# Patient Record
Sex: FEMALE | Race: WHITE
[De-identification: names, ages, dates, MRNs, and addresses within clinical notes are randomized per-mention and may not be internally consistent; named-entity substitution may affect disease eponyms.]

---

## 2017-03-09 ENCOUNTER — HOSPITAL ENCOUNTER (EMERGENCY)
Dept: HOSPITAL 62 - ER | Age: 62
Discharge: HOME | End: 2017-03-09
Payer: MEDICARE

## 2017-03-09 VITALS — DIASTOLIC BLOOD PRESSURE: 92 MMHG | SYSTOLIC BLOOD PRESSURE: 126 MMHG

## 2017-03-09 DIAGNOSIS — F17.200: ICD-10-CM

## 2017-03-09 DIAGNOSIS — Z88.1: ICD-10-CM

## 2017-03-09 DIAGNOSIS — Z87.01: ICD-10-CM

## 2017-03-09 DIAGNOSIS — R05: ICD-10-CM

## 2017-03-09 DIAGNOSIS — Z79.899: ICD-10-CM

## 2017-03-09 DIAGNOSIS — J44.1: Primary | ICD-10-CM

## 2017-03-09 DIAGNOSIS — R06.02: ICD-10-CM

## 2017-03-09 DIAGNOSIS — I10: ICD-10-CM

## 2017-03-09 LAB
ALBUMIN SERPL-MCNC: 4.4 G/DL (ref 3.5–5)
ALP SERPL-CCNC: 70 U/L (ref 38–126)
ALT SERPL-CCNC: 27 U/L (ref 9–52)
ANION GAP SERPL CALC-SCNC: 14 MMOL/L (ref 5–19)
AST SERPL-CCNC: 24 U/L (ref 14–36)
BASOPHILS # BLD AUTO: 0.1 10^3/UL (ref 0–0.2)
BASOPHILS NFR BLD AUTO: 0.9 % (ref 0–2)
BILIRUB DIRECT SERPL-MCNC: 0 MG/DL (ref 0–0.3)
BILIRUB SERPL-MCNC: 0.4 MG/DL (ref 0.2–1.3)
BUN SERPL-MCNC: 12 MG/DL (ref 7–20)
CALCIUM: 11.8 MG/DL (ref 8.4–10.2)
CHLORIDE SERPL-SCNC: 99 MMOL/L (ref 98–107)
CK MB SERPL-MCNC: 1.21 NG/ML (ref ?–4.55)
CK SERPL-CCNC: 53 U/L (ref 30–135)
CO2 SERPL-SCNC: 29 MMOL/L (ref 22–30)
CREAT SERPL-MCNC: 0.55 MG/DL (ref 0.52–1.25)
EOSINOPHIL # BLD AUTO: 1.1 10^3/UL (ref 0–0.6)
EOSINOPHIL NFR BLD AUTO: 12.7 % (ref 0–6)
ERYTHROCYTE [DISTWIDTH] IN BLOOD BY AUTOMATED COUNT: 12.8 % (ref 11.5–14)
GLUCOSE SERPL-MCNC: 97 MG/DL (ref 75–110)
HCT VFR BLD CALC: 43.3 % (ref 36–47)
HGB BLD-MCNC: 14.6 G/DL (ref 12–15.5)
HGB HCT DIFFERENCE: 0.5
LYMPHOCYTES # BLD AUTO: 2 10^3/UL (ref 0.5–4.7)
LYMPHOCYTES NFR BLD AUTO: 22.8 % (ref 13–45)
MCH RBC QN AUTO: 31.1 PG (ref 27–33.4)
MCHC RBC AUTO-ENTMCNC: 33.7 G/DL (ref 32–36)
MCV RBC AUTO: 92 FL (ref 80–97)
MONOCYTES # BLD AUTO: 0.7 10^3/UL (ref 0.1–1.4)
MONOCYTES NFR BLD AUTO: 8.3 % (ref 3–13)
NEUTROPHILS # BLD AUTO: 4.9 10^3/UL (ref 1.7–8.2)
NEUTS SEG NFR BLD AUTO: 55.3 % (ref 42–78)
POTASSIUM SERPL-SCNC: 4.2 MMOL/L (ref 3.6–5)
PROT SERPL-MCNC: 8 G/DL (ref 6.3–8.2)
RBC # BLD AUTO: 4.7 10^6/UL (ref 3.72–5.28)
SODIUM SERPL-SCNC: 142.2 MMOL/L (ref 137–145)
TROPONIN I SERPL-MCNC: < 0.012 NG/ML
WBC # BLD AUTO: 8.8 10^3/UL (ref 4–10.5)

## 2017-03-09 PROCEDURE — 84484 ASSAY OF TROPONIN QUANT: CPT

## 2017-03-09 PROCEDURE — 82553 CREATINE MB FRACTION: CPT

## 2017-03-09 PROCEDURE — 36415 COLL VENOUS BLD VENIPUNCTURE: CPT

## 2017-03-09 PROCEDURE — 93005 ELECTROCARDIOGRAM TRACING: CPT

## 2017-03-09 PROCEDURE — 93010 ELECTROCARDIOGRAM REPORT: CPT

## 2017-03-09 PROCEDURE — 96374 THER/PROPH/DIAG INJ IV PUSH: CPT

## 2017-03-09 PROCEDURE — 80053 COMPREHEN METABOLIC PANEL: CPT

## 2017-03-09 PROCEDURE — 82550 ASSAY OF CK (CPK): CPT

## 2017-03-09 PROCEDURE — 83880 ASSAY OF NATRIURETIC PEPTIDE: CPT

## 2017-03-09 PROCEDURE — 94640 AIRWAY INHALATION TREATMENT: CPT

## 2017-03-09 PROCEDURE — 85025 COMPLETE CBC W/AUTO DIFF WBC: CPT

## 2017-03-09 PROCEDURE — 71010: CPT

## 2017-03-09 PROCEDURE — 99285 EMERGENCY DEPT VISIT HI MDM: CPT

## 2017-03-09 RX ADMIN — ALBUTEROL SULFATE SCH MG: 2.5 SOLUTION RESPIRATORY (INHALATION) at 11:14

## 2017-03-09 RX ADMIN — ALBUTEROL SULFATE SCH MG: 2.5 SOLUTION RESPIRATORY (INHALATION) at 10:49

## 2017-03-09 NOTE — ER DOCUMENT REPORT
ED Medical Screen (RME)





- General


Chief Complaint: Breathing Difficulty


Stated Complaint: SHORTNESS OF BREATH


Notes: 


difficulty breathing and SOB, saw her PCP 3 days ago and was put on abx and 

nebulizers. presents today with worsening shortness of breath. denies chest pain








PMH: COPD, former smoker





Wheezing b/l throughout, difficulty speaking











I have greeted and performed a rapid initial assessment of this patient. A 

comprehensive ED assessment and evaluation of the patient, analysis of test 

results and completion of the medical decision making process will be conducted 

by additional ED providers.


TRAVEL OUTSIDE OF THE [place] IN LAST 30 DAYS: No





- Related Data


Allergies/Adverse Reactions: 


 





ciprofloxacin [From Cipro] Allergy (Verified 03/09/17 10:05)


 


ciprofloxacin HCl [From Cipro] Allergy (Verified 03/09/17 10:05)


 


mycins Allergy (Unknown, Uncoded 03/09/17 10:05)


 











Past Medical History





- Social History


Chew tobacco use (# tins/day): No


Frequency of alcohol use: None


Drug Abuse: None





- Past Medical History


Cardiac Medical History: Reports: Hx Hypertension


Pulmonary Medical History: Reports: Hx Pneumonia


   Denies: Hx Asthma, Hx Bronchitis, Hx COPD


Endocrine Medical History: Denies: Hx Diabetes Mellitus Type 1, Hx 

Hypothyroidism


Renal/ Medical History: Denies: Hx Peritoneal Dialysis


Psychiatric Medical History: Reports: Hx Depression


Past Surgical History: Reports: Hx Cholecystectomy, Hx Tubal Ligation





- Immunizations


Hx Diphtheria, Pertussis, Tetanus Vaccination: No

## 2017-03-09 NOTE — EKG REPORT
SEVERITY:- BORDERLINE ECG -

SINUS RHYTHM

PROBABLE LEFT ATRIAL ABNORMALITY

:

Confirmed by: Seth Ma 09-Mar-2017 20:02:11

## 2017-03-09 NOTE — ER DOCUMENT REPORT
ED Respiratory Problem





- General


Mode of Arrival: Ambulatory


Information source: Patient


TRAVEL OUTSIDE OF THE [place] IN LAST 30 DAYS: No





- HPI


Patient complains to provider of: Cough, Short of breath


Onset: Other - 1 week ago


Context: Hx COPD, Smoker


Cough: Nonproductive - mostly non-productive, at times productive


Sputum amount: Scant


Sputum color: Green


Associated symptoms: Other - see above





<KEAGAN FUENTES - Last Filed: 03/09/17 10:32>





<CEDRIC HICKMAN - Last Filed: 03/09/17 13:32>





- General


Chief Complaint: Breathing Difficulty


Stated Complaint: SHORTNESS OF BREATH


Notes: 


61 year old female with history of hypertension and COPD presents to the ED 

complaining of shortness of breath and a cough that started 1 week ago. Patient 

reports that she "gets this every year" but that it is "worse" this year than 

in the past. Patient reports that her cough is mostly non-productive, but she 

does cough up green sputum after a hard coughing episode. Patient denies fever. 

Patient uses 3-4 breathing treatments at home a day. Patient is not on any 

oxygen at home. Patient had Cefdinir called in by her primary care provider, 

but claims it does not seem to be helping. Patient started this antibiotic 2 

days ago.  (KEAGAN FUENTES)





- Related Data


Allergies/Adverse Reactions: 


 





ciprofloxacin [From Cipro] Allergy (Verified 03/09/17 10:05)


 


ciprofloxacin HCl [From Cipro] Allergy (Verified 03/09/17 10:05)


 


mycins Allergy (Unknown, Uncoded 03/09/17 10:05)


 











Past Medical History





- General


Information source: Patient





- Social History


Smoking Status: Current Every Day Smoker


Chew tobacco use (# tins/day): No


Frequency of alcohol use: None


Drug Abuse: None


Family History: Reviewed & Not Pertinent


Patient has suicidal ideation: No


Patient has homicidal ideation: No





- Past Medical History


Cardiac Medical History: Reports: Hx Hypertension


Pulmonary Medical History: Reports: Hx COPD, Hx Pneumonia


Renal/ Medical History: Denies: Hx Peritoneal Dialysis


Psychiatric Medical History: Reports: Hx Depression


Past Surgical History: Reports: Hx Cholecystectomy, Hx Tubal Ligation





- Immunizations


Hx Diphtheria, Pertussis, Tetanus Vaccination: No





<KEAGAN FUENTES - Last Filed: 03/09/17 10:32>





Review of Systems





- Review of Systems


Constitutional: No symptoms reported.  denies: Fever


EENT: No symptoms reported


Cardiovascular: No symptoms reported


Respiratory: See HPI, Cough, Short of breath, Sputum - green


Gastrointestinal: No symptoms reported


Genitourinary: No symptoms reported


Female Genitourinary: No symptoms reported


Musculoskeletal: No symptoms reported


Skin: No symptoms reported


Hematologic/Lymphatic: No symptoms reported


Neurological/Psychological: No symptoms reported


-: Yes All other systems reviewed and negative





<KEAGAN FUENTES - Last Filed: 03/09/17 10:32>





Physical Exam





- General


General appearance: Alert


In distress: None





- HEENT


Head: Normocephalic, Atraumatic


Eyes: Normal


Extraocular movements intact: Yes


Pupils: PERRL


Ears: Normal


External canal: Normal


Tympanic membrane: Normal





- Respiratory


Respiratory status: No respiratory distress


Breath sounds: Rhonchi - diffuse inspiratory and expiratory, Wheezing - diffuse 

inspiratory and expiratory





- Cardiovascular


Rhythm: Regular


Heart sounds: Normal auscultation





- Abdominal


Inspection: Normal





- Back


Back: Normal





- Extremities


General upper extremity: Normal inspection, Normal ROM


General lower extremity: Normal inspection, Normal ROM





- Neurological


Neuro grossly intact: Yes





- Psychological


Associated symptoms: Normal affect, Normal mood





- Skin


Skin Temperature: Warm


Skin Moisture: Dry


Skin Color: Normal





<KEAGAN FUENTES - Last Filed: 03/09/17 10:32>





Course





- Laboratory


Result Diagrams: 


 03/09/17 10:28





 03/09/17 10:28





- Diagnostic Test


Radiology reviewed: Image reviewed, Reports reviewed - Chest x-ray does not 

show an acute process





- EKG Interpretation by Me


EKG shows normal: Sinus rhythm, Axis, Intervals, QRS Complexes, ST-T Waves


Rate: Normal - 94


Rhythm: NSR


P Waves: LAE


When compared to previous EKG there are: No significant change





<CEDRIC HICKMAN - Last Filed: 03/09/17 13:32>





- Vital Signs


Vital signs: 


 











Temp Pulse Resp BP Pulse Ox


 


 97.8 F   94   28 H  151/98 H  93 


 


 03/09/17 10:08  03/09/17 10:08  03/09/17 11:00  03/09/17 10:08  03/09/17 11:00














- Laboratory


Laboratory results interpreted by me: 


 











  03/09/17 03/09/17





  10:28 10:28


 


Eosinophils %  12.7 H 


 


Absolute Eosinophils  1.1 H 


 


Calcium   11.8 H














Discharge





<KEAGAN FUENTES - Last Filed: 03/09/17 10:32>





<CEDRIC HICKMAN - Last Filed: 03/09/17 13:32>





- Discharge


Clinical Impression: 


 Acute exacerbation of chronic obstructive pulmonary disease (COPD)





Condition: Stable


Disposition: HOME, SELF-CARE


Additional Instructions: 


Start the prednisone as prescribed tomorrow.


Use your nebulizer every 3-4 hours for wheezing and shortness of breath.


Drink plenty of fluids and get plenty of rest.


Stop smoking.


Follow-up with your doctor tomorrow if not improving.





RETURN TO THE EMERGENCY ROOM IF ANY NEW OR WORSENING SYMPTOMS.








Prescriptions: 


Prednisone [Deltasone 10 mg Tablet] 10 mg PO ASDIR PRN #21 tablet


 PRN Reason: 


Referrals: 


TIERRA RAMIRES MD [[facility]] - Follow up as needed


Scribe Attestation: 





03/09/17 13:32


I personally performed the services described in the documentation, reviewed 

and edited the documentation which was dictated to the scribe in my presence, 

and it accurately records my words and actions. (CEDRIC HICKMAN)





Scribe Documentation





- Scribe


Written by Blase:: Jalen Diaz, 03/09/2017 1041


acting as scribe for :: Adin





<KEAGAN FUENTES - Last Filed: 03/09/17 10:32>

## 2017-04-15 ENCOUNTER — HOSPITAL ENCOUNTER (OUTPATIENT)
Dept: HOSPITAL 62 - RAD | Age: 62
End: 2017-04-15
Attending: SPECIALIST
Payer: MEDICARE

## 2017-04-15 DIAGNOSIS — M54.12: Primary | ICD-10-CM

## 2017-04-15 DIAGNOSIS — M54.16: ICD-10-CM

## 2017-04-15 PROCEDURE — 72148 MRI LUMBAR SPINE W/O DYE: CPT

## 2017-04-15 PROCEDURE — 72141 MRI NECK SPINE W/O DYE: CPT

## 2017-05-20 ENCOUNTER — HOSPITAL ENCOUNTER (INPATIENT)
Dept: HOSPITAL 62 - ER | Age: 62
LOS: 2 days | Discharge: HOME | DRG: 189 | End: 2017-05-22
Attending: INTERNAL MEDICINE | Admitting: INTERNAL MEDICINE
Payer: MEDICARE

## 2017-05-20 DIAGNOSIS — F17.210: ICD-10-CM

## 2017-05-20 DIAGNOSIS — Z88.1: ICD-10-CM

## 2017-05-20 DIAGNOSIS — I10: ICD-10-CM

## 2017-05-20 DIAGNOSIS — J96.01: Primary | ICD-10-CM

## 2017-05-20 DIAGNOSIS — J44.1: ICD-10-CM

## 2017-05-20 DIAGNOSIS — F32.9: ICD-10-CM

## 2017-05-20 DIAGNOSIS — Z79.899: ICD-10-CM

## 2017-05-20 DIAGNOSIS — Z90.49: ICD-10-CM

## 2017-05-20 DIAGNOSIS — J20.9: ICD-10-CM

## 2017-05-20 DIAGNOSIS — J44.0: ICD-10-CM

## 2017-05-20 LAB
ANION GAP SERPL CALC-SCNC: 16 MMOL/L (ref 5–19)
BASE EXCESS BLDA CALC-SCNC: 1.5 MMOL/L
BASOPHILS # BLD AUTO: 0.1 10^3/UL (ref 0–0.2)
BASOPHILS NFR BLD AUTO: 0.6 % (ref 0–2)
BUN SERPL-MCNC: 10 MG/DL (ref 7–20)
CALCIUM: 11.5 MG/DL (ref 8.4–10.2)
CHLORIDE SERPL-SCNC: 102 MMOL/L (ref 98–107)
CO2 SERPL-SCNC: 26 MMOL/L (ref 22–30)
CREAT SERPL-MCNC: 0.59 MG/DL (ref 0.52–1.25)
EOSINOPHIL # BLD AUTO: 0 10^3/UL (ref 0–0.6)
EOSINOPHIL NFR BLD AUTO: 0.1 % (ref 0–6)
ERYTHROCYTE [DISTWIDTH] IN BLOOD BY AUTOMATED COUNT: 13.4 % (ref 11.5–14)
GLUCOSE SERPL-MCNC: 136 MG/DL (ref 75–110)
HCT VFR BLD CALC: 44.4 % (ref 36–47)
HGB BLD-MCNC: 14.9 G/DL (ref 12–15.5)
HGB HCT DIFFERENCE: 0.3
LYMPHOCYTES # BLD AUTO: 0.6 10^3/UL (ref 0.5–4.7)
LYMPHOCYTES NFR BLD AUTO: 6 % (ref 13–45)
MCH RBC QN AUTO: 31 PG (ref 27–33.4)
MCHC RBC AUTO-ENTMCNC: 33.6 G/DL (ref 32–36)
MCV RBC AUTO: 92 FL (ref 80–97)
MONOCYTES # BLD AUTO: 0.1 10^3/UL (ref 0.1–1.4)
MONOCYTES NFR BLD AUTO: 0.6 % (ref 3–13)
NEUTROPHILS # BLD AUTO: 9.8 10^3/UL (ref 1.7–8.2)
NEUTS SEG NFR BLD AUTO: 92.7 % (ref 42–78)
POTASSIUM SERPL-SCNC: 3.5 MMOL/L (ref 3.6–5)
RBC # BLD AUTO: 4.82 10^6/UL (ref 3.72–5.28)
SAO2 % BLDA: 89.5 % (ref 94–98)
SODIUM SERPL-SCNC: 143.9 MMOL/L (ref 137–145)
TROPONIN I SERPL-MCNC: < 0.012 NG/ML
WBC # BLD AUTO: 10.6 10^3/UL (ref 4–10.5)

## 2017-05-20 PROCEDURE — 82803 BLOOD GASES ANY COMBINATION: CPT

## 2017-05-20 PROCEDURE — 87077 CULTURE AEROBIC IDENTIFY: CPT

## 2017-05-20 PROCEDURE — 93005 ELECTROCARDIOGRAM TRACING: CPT

## 2017-05-20 PROCEDURE — 71010: CPT

## 2017-05-20 PROCEDURE — 96365 THER/PROPH/DIAG IV INF INIT: CPT

## 2017-05-20 PROCEDURE — 85027 COMPLETE CBC AUTOMATED: CPT

## 2017-05-20 PROCEDURE — 80048 BASIC METABOLIC PNL TOTAL CA: CPT

## 2017-05-20 PROCEDURE — 84484 ASSAY OF TROPONIN QUANT: CPT

## 2017-05-20 PROCEDURE — 93010 ELECTROCARDIOGRAM REPORT: CPT

## 2017-05-20 PROCEDURE — 94640 AIRWAY INHALATION TREATMENT: CPT

## 2017-05-20 PROCEDURE — 99285 EMERGENCY DEPT VISIT HI MDM: CPT

## 2017-05-20 PROCEDURE — 87186 SC STD MICRODIL/AGAR DIL: CPT

## 2017-05-20 PROCEDURE — 87040 BLOOD CULTURE FOR BACTERIA: CPT

## 2017-05-20 PROCEDURE — 83880 ASSAY OF NATRIURETIC PEPTIDE: CPT

## 2017-05-20 PROCEDURE — 36600 WITHDRAWAL OF ARTERIAL BLOOD: CPT

## 2017-05-20 PROCEDURE — 36415 COLL VENOUS BLD VENIPUNCTURE: CPT

## 2017-05-20 PROCEDURE — 85025 COMPLETE CBC W/AUTO DIFF WBC: CPT

## 2017-05-20 PROCEDURE — 71275 CT ANGIOGRAPHY CHEST: CPT

## 2017-05-20 RX ADMIN — ACETAMINOPHEN PRN MG: 325 TABLET ORAL at 22:02

## 2017-05-20 RX ADMIN — Medication SCH ML: at 22:03

## 2017-05-20 RX ADMIN — OXYCODONE AND ACETAMINOPHEN PRN TAB: 5; 325 TABLET ORAL at 20:46

## 2017-05-20 RX ADMIN — LEVOFLOXACIN SCH ML: 750 INJECTION, SOLUTION INTRAVENOUS at 19:07

## 2017-05-20 RX ADMIN — IPRATROPIUM BROMIDE AND ALBUTEROL SULFATE SCH ML: 2.5; .5 SOLUTION RESPIRATORY (INHALATION) at 20:09

## 2017-05-20 RX ADMIN — MAGNESIUM SULFATE IN DEXTROSE SCH ML: 10 INJECTION, SOLUTION INTRAVENOUS at 14:35

## 2017-05-20 RX ADMIN — MAGNESIUM SULFATE IN DEXTROSE SCH ML: 10 INJECTION, SOLUTION INTRAVENOUS at 17:25

## 2017-05-20 RX ADMIN — METHYLPREDNISOLONE SODIUM SUCCINATE SCH MG: 40 INJECTION, POWDER, FOR SOLUTION INTRAMUSCULAR; INTRAVENOUS at 22:03

## 2017-05-20 NOTE — PDOC H&P
History of Present Illness


Admission Date/PCP: 


  05/20/17 16:09





  TIERRA RAMIRES MD





Patient complains of: can't catch my breath


History of Present Illness: 


JUDITH REESE is a 61 year old female known COPD who continues to smoke 

presents to the ED with several day history of worsening SOA that occurs every 

Spring.  She notes a dry hacking cough and breathlessness with a tightness 

across her chest asctd with wheezing and a rattle in her chest at times.  she 

reports recent abx and steroids as outpt 2 wks ago and she got better but seems 

to get worse soon after cessation.  she denies fernando cardiac type chest pain, 

palpitations, recent travel, sedentary lifestyle, calf swelling, orthopnea, PND 

,sick contacts.  she has increased her use of home nebs and rescue inhalers and 

started her rescue pred-pack 2d ago with no improvement in her symptoms so she 

presented to ED by EMS.





eval found her bronchospastic, hypoxic on blood gas with only minimal 

improvement after routine care in ED so we were asked to admit for further eval 

and management.








Past Medical History


Cardiac Medical History: Reports: Hypertension


Pulmonary Medical History: Reports: Chronic Obstructive Pulmonary Disease (COPD)

, Pneumonia


   Denies: Asthma, Bronchitis


Endocrine Medical History: 


   Denies: Diabetes Mellitus Type 1, Hypothyroidism


Musculoskeltal Medical History: Reports: Other - Hx DDD


Psychiatric Medical History: Reports: Depression





Past Surgical History


Past Surgical History: Reports: Cholecystectomy, Tonsillectomy, Tubal Ligation





Social History


Smoking Status: Current Every Day Smoker


Cigarettes Packs Per Day: 0.5


Frequency of Alcohol Use: None


Hx Recreational Drug Use: No


Drugs: None


Hx Prescription Drug Abuse: No





- Advance Directive


Resuscitation Status: Full Code





Family History


Family History: CAD, COPD


Parental Family History Reviewed: Yes


Children Family History Reviewed: Yes


Sibling(s) Family History Reviewed.: Yes





Medication/Allergy


Home Medications: 








Albuterol Sulfate [Proair HFA Inhalation Aerosol 8.5 gm MDI] 2 puff IH Q4H PRN #

1 mdi 04/10/15 


Bisoprolol Fumarate/Hctz [Ziac 2.5-6.25 mg Tablet] 1 tab PO DAILY 04/10/15 


Doxycycline Hyclate 100 mg PO BID #14 capsule 04/10/15 


Hydrocodone/Acetaminophen [Norco 5-325 mg Tablet] 1 tab PO Q4 04/10/15 


Paroxetine HCl [Paxil 20 mg Tablet] 80 mg PO BID 04/10/15 


Prednisone [Deltasone 20 mg Tablet] 3 tab PO DAILY 5 Days 04/10/15 


Prednisone [Deltasone 10 mg Tablet] 10 mg PO ASDIR PRN #21 tablet 03/09/17 








Allergies/Adverse Reactions: 


 





ciprofloxacin [From Cipro] Allergy (Verified 05/20/17 13:50)


 


ciprofloxacin HCl [From Cipro] Allergy (Verified 05/20/17 13:50)


 


mycins Allergy (Unknown, Uncoded 05/20/17 13:50)


 











Review of Systems


Constitutional: ABSENT: chills, fever(s), headache(s), weight gain, weight loss


Eyes: ABSENT: visual disturbances


Ears: ABSENT: hearing changes


Cardiovascular: ABSENT: chest pain, dyspnea on exertion, edema, orthropnea, 

palpitations


Respiratory: PRESENT: as per HPI.  ABSENT: hemoptysis


Gastrointestinal: ABSENT: abdominal pain, constipation, diarrhea, hematemesis, 

hematochezia, nausea, vomiting


Genitourinary: ABSENT: dysuria, hematuria


Musculoskeletal: ABSENT: joint swelling


Integumentary: ABSENT: rash, wounds


Neurological: ABSENT: abnormal gait, abnormal speech, confusion, dizziness, 

focal weakness, syncope


Psychiatric: ABSENT: anxiety, depression, homidical ideation, suicidal ideation


Endocrine: ABSENT: cold intolerance, heat intolerance, polydipsia, polyuria


Hematologic/Lymphatic: ABSENT: easy bleeding, easy bruising





Physical Exam


Vital Signs: 


 











Temp Pulse Resp BP Pulse Ox


 


       20   166/91 H  94 


 


       05/20/17 14:00  05/20/17 13:27  05/20/17 14:00











General appearance: PRESENT: mild distress, obese, well-developed, well-

nourished


Head exam: PRESENT: atraumatic, normocephalic


Eye exam: PRESENT: EOMI.  ABSENT: conjunctival injection, scleral icterus


Mouth exam: PRESENT: moist, neck supple


Neck exam: PRESENT: full ROM.  ABSENT: JVD, tenderness, tracheal deviation


Respiratory exam: PRESENT: accessory muscle use, decreased breath sounds, 

wheezes


Cardiovascular exam: PRESENT: RRR.  ABSENT: systolic murmur


Pulses: PRESENT: normal carotid pulses, normal radial pulses


Vascular exam: PRESENT: normal capillary refill


GI/Abdominal exam: PRESENT: normal bowel sounds, soft.  ABSENT: tenderness


Extremities exam: ABSENT: calf tenderness, clubbing, pedal edema


Musculoskeletal exam: PRESENT: ambulatory


Neurological exam: PRESENT: alert, awake, oriented to person, oriented to place

, oriented to time, oriented to situation


Psychiatric exam: PRESENT: appropriate affect, normal mood


Skin exam: PRESENT: warm.  ABSENT: dry





Results


Laboratory Results: 


labs reviewed, cbc wnl, K 3.5, Ca 11.5; paO2 57, pH 7.4, pCO2 44


EKG Comments: 


NSR QTc 481


Impressions: 


 





Chest X-Ray  05/20/17 12:47


IMPRESSION:  COPD.  NO ACUTE RADIOGRAPHIC FINDING IN THE CHEST.


 











Status: Imported from PACS





Assessment & Plan





- Diagnosis


(1) COPD (chronic obstructive pulmonary disease)


Qualifiers: 


     COPD type: COPD with acute exacerbation        Qualified Code(s): J44.1 - 

Chronic obstructive pulmonary disease with (acute) exacerbation  


Is this a current diagnosis for this admission?: YesPlan: 


admit to monitored bed for IV abx, steroids, nebs and careful hemodynamic 

monitoring due to risk of resp compromise or collapse.  ck CTA chest to eval of 

PE, pneumonia, structural disease, etc.








(2) Acute bacterial bronchitis


Is this a current diagnosis for this admission?: YesPlan: 


at risk for GN org's due to fixed lung disease, will start with levaquin and 

monitor for response








(3) HTN (hypertension)


Qualifiers: 


     Hypertension type: essential hypertension        Qualified Code(s): I10 - 

Essential (primary) hypertension  


Is this a current diagnosis for this admission?: YesPlan: 


resume home regimen once confirmed and titrate to effect








(4) Acute hypoxemic respiratory failure


Is this a current diagnosis for this admission?: YesPlan: 


supplemental O2 and treat as above.











- Time


Time Spent: Greater than 70 Minutes


Medications reviewed and adjusted accordingly: Yes


Anticipated discharge: Home


Within: within 72 hours





- Inpatient Certification


Based on my medical assessment, after consideration of the patient's 

comorbidities, presenting symptoms, or acuity I expect that the services needed 

warrant INPATIENT care.: Yes


I certify that my determination is in accordance with my understanding of 

Medicare's requirements for reasonable and necessary INPATIENT services [42 CFR 

412.3e].: Yes


Medical Necessity: Failure to Improve With Outpatient Therapy, Significant 

Comorbidiites Make Outpatient Treatment Too Risky, Need For Continuous 

Telemetry Monitoring, Need for IV Antibiotics, Risk of Complication if Not 

Cared For in Hospital

## 2017-05-20 NOTE — ER DOCUMENT REPORT
ED Respiratory Problem





- General


Mode of Arrival: Medic


Information source: Patient, Relative - sister


TRAVEL OUTSIDE OF THE [place] IN LAST 30 DAYS: No





- HPI


Patient complains to provider of: Short of breath


Associated symptoms: Other - See above





<JACY POLLOCK - Last Filed: 05/20/17 14:28>





<KAE EVANS - Last Filed: 05/20/17 15:43>





- General


Chief Complaint: Breathing Difficulty


Stated Complaint: BREATHING ISSUES


Time Seen by Provider: 05/20/17 13:34


Notes: 


Patient is a 61 year old female, with a past medical history including COPD and 

DDD, who presents to the emergency department complaining of shortness of 

breath. Patient reports that it onset suddenly and her sister felt the need to 

call EMS, patient states that this was the worst episode of COPD exacerbation 

she has ever had. Patient reports she was recently treated for bronchitis and 

was on antibiotics and steroids. Patient reports she has no relief from her 

nebulizer treatments at home and feels as though she has a "wad in her throat". 

Patient is not on home oxygen and denies heart problems. Patient states she 

recently tried to quit smoking but was unsuccessful and is still smoking 

regularly. Patient currently takes 4 10mg of Oxycodone daily.  (JACY POLLOCK)





- Related Data


Allergies/Adverse Reactions: 


 





ciprofloxacin [From Cipro] Allergy (Verified 05/20/17 13:50)


 


ciprofloxacin HCl [From Cipro] Allergy (Verified 05/20/17 13:50)


 


mycins Allergy (Unknown, Uncoded 05/20/17 13:50)


 











Past Medical History





- General


Information source: Patient





- Social History


Smoking Status: Current Every Day Smoker


Chew tobacco use (# tins/day): No


Frequency of alcohol use: None


Drug Abuse: None


Family History: Reviewed & Not Pertinent





- Past Medical History


Cardiac Medical History: Reports: Hx Hypertension


Pulmonary Medical History: Reports: Hx COPD, Hx Pneumonia


Musculoskeltal Medical History: Reports Other - Hx DDD


Psychiatric Medical History: Reports: Hx Depression


Past Surgical History: Reports: Hx Cholecystectomy, Hx Tonsillectomy, Hx Tubal 

Ligation





- Immunizations


Hx Diphtheria, Pertussis, Tetanus Vaccination: No





<JACY POLLOCK - Last Filed: 05/20/17 14:28>





Review of Systems





- Review of Systems


Constitutional: No symptoms reported


EENT: No symptoms reported


Cardiovascular: No symptoms reported


Respiratory: See HPI, Short of breath


Gastrointestinal: No symptoms reported


Genitourinary: No symptoms reported


Female Genitourinary: No symptoms reported


Musculoskeletal: No symptoms reported


Skin: No symptoms reported


Hematologic/Lymphatic: No symptoms reported


Neurological/Psychological: No symptoms reported


-: Yes All other systems reviewed and negative





<PRICEJACY - Last Filed: 05/20/17 14:28>





Physical Exam





- Vital signs


Interpretation: Normal





- General


General appearance: Appears well, Alert





- HEENT


Head: Normocephalic, Atraumatic





- Respiratory


Respiratory status: No respiratory distress


Chest status: Nontender


Breath sounds: Decreased air movement, Wheezing


Chest palpation: Normal





- Cardiovascular


Rhythm: Regular


Heart sounds: Normal auscultation


Murmur: No


Pulses: Normal: Radial, Dorsalis pedis





- Back


Back: Normal, Nontender





- Extremities


General upper extremity: Normal inspection


General lower extremity: Normal inspection





- Neurological


Neuro grossly intact: Yes


Cognition: Normal


Orientation: AAOx4


[place] Coma Scale Eye Opening: Spontaneous


[place] Coma Scale Verbal: Oriented


[place] Coma Scale Motor: Obeys Commands


[place] Coma Scale Total: 15


Speech: Normal





- Psychological


Associated symptoms: Normal affect, Normal mood





- Skin


Skin Temperature: Warm


Skin Moisture: Dry


Skin Color: Normal





<JACY POLLOCK - Last Filed: 05/20/17 14:28>





Course





- Laboratory


Result Diagrams: 


 05/20/17 13:42





 05/20/17 13:42





<JACY POLLOCK - Last Filed: 05/20/17 14:28>





- Laboratory


Result Diagrams: 


 05/20/17 13:42





 05/20/17 13:42





<KAE EVANS - Last Filed: 05/20/17 15:43>





- Re-evaluation


Re-evalutation: 





05/20/17 14:14


Patient presents the emergency department with a chief complaint of difficulty 

breathing.  She has a history of COPD which has been getting worse for the past 

few weeks.  Her primary care physician recently started on prednisone and an 

unknown antibiotic which we think is Levaquin.  In addition that she has 

nebulizer at home but does not require home oxygen.  She is set up to see a 

pulmonologist in the next 2-3 weeks.  She is not on a steroid inhaler 

currently.  Her sister was driving here today for increasing shortness of 

breath wheezing and she got significantly worse on the way here she had to pull 

over and called the ambulance bring her to the emergency department.  Per EMS 

to give her Solu-Medrol breathing treatment with significant improvement.  At 

the bedside she is satting 93-94% on 3 L nasal cannula.  EKG shows sinus rhythm 

no acute ST segment elevation or depression she denies any chest heaviness 

pressure history of MI PE dissection.  Her shortness of breath is not 

exertional in nature.  She has had a little bit of a cough nonproductive of 

sputum and no fever chills or history of pneumonia.  No nausea vomiting no pain 

diarrhea (KAE EVANS)





- Vital Signs


Vital signs: 


 











Temp Pulse Resp BP Pulse Ox


 


       20   166/91 H  94 


 


       05/20/17 14:00  05/20/17 13:27  05/20/17 14:00














- Laboratory


Laboratory results interpreted by me: 


 











  05/20/17 05/20/17 05/20/17





  13:42 13:42 15:00


 


WBC  10.6 H  


 


Seg Neutrophils %  92.7 H  


 


Lymphocytes %  6.0 L  


 


Monocytes %  0.6 L  


 


Absolute Neutrophils  9.8 H  


 


ABG pO2    56.8 L


 


ABG HCO3    26.8 H


 


ABG Total CO2    28.1 H


 


ABG O2 Saturation    89.5 L


 


Potassium   3.5 L 


 


Glucose   136 H 


 


Calcium   11.5 H 














- EKG Interpretation by Me


Additional EKG results interpreted by me: 





05/20/17 14:16


EKG interpreted by myself to reveal sinus rhythm at 94 bpm no acute ST segment 

elevation or depression (KAE EVANS)





Discharge





<JACY POLLOCK - Last Filed: 05/20/17 14:28>





<KAE EVANS - Last Filed: 05/20/17 15:43>





- Discharge


Referrals: 


TIERRA RAMIRES MD [Primary Care Provider] - Follow up as needed


Scribe Attestation: 





05/20/17 14:16


I personally performed the services described in the documentation reviewed the 

documentation recorded by my scribe in my presence and it accurately and 

completely records my words and actions (KAE EVANS)





Scribe Documentation





- Scribe


Written by Feng:: feng Brown, 5/20/17, 2525


acting as scribe for :: Get





<JACY POLLOCK - Last Filed: 05/20/17 14:28>

## 2017-05-21 LAB
ERYTHROCYTE [DISTWIDTH] IN BLOOD BY AUTOMATED COUNT: 13.6 % (ref 11.5–14)
HCT VFR BLD CALC: 41 % (ref 36–47)
HGB BLD-MCNC: 13.5 G/DL (ref 12–15.5)
HGB HCT DIFFERENCE: -0.5
MCH RBC QN AUTO: 30.3 PG (ref 27–33.4)
MCHC RBC AUTO-ENTMCNC: 32.9 G/DL (ref 32–36)
MCV RBC AUTO: 92 FL (ref 80–97)
RBC # BLD AUTO: 4.45 10^6/UL (ref 3.72–5.28)
WBC # BLD AUTO: 16.5 10^3/UL (ref 4–10.5)

## 2017-05-21 RX ADMIN — DOCUSATE SODIUM SCH MG: 100 CAPSULE, LIQUID FILLED ORAL at 09:09

## 2017-05-21 RX ADMIN — LANSOPRAZOLE SCH MG: 30 TABLET, ORALLY DISINTEGRATING, DELAYED RELEASE ORAL at 05:31

## 2017-05-21 RX ADMIN — METHYLPREDNISOLONE SODIUM SUCCINATE SCH MG: 40 INJECTION, POWDER, FOR SOLUTION INTRAMUSCULAR; INTRAVENOUS at 05:32

## 2017-05-21 RX ADMIN — METHYLPREDNISOLONE SODIUM SUCCINATE SCH MG: 40 INJECTION, POWDER, FOR SOLUTION INTRAMUSCULAR; INTRAVENOUS at 21:41

## 2017-05-21 RX ADMIN — HYDROCODONE BITARTRATE AND ACETAMINOPHEN PRN TAB: 10; 325 TABLET ORAL at 17:17

## 2017-05-21 RX ADMIN — ATENOLOL SCH MG: 50 TABLET ORAL at 09:09

## 2017-05-21 RX ADMIN — ACETAMINOPHEN PRN MG: 325 TABLET ORAL at 06:35

## 2017-05-21 RX ADMIN — LEVOFLOXACIN SCH ML: 750 INJECTION, SOLUTION INTRAVENOUS at 17:18

## 2017-05-21 RX ADMIN — HYDROCHLOROTHIAZIDE SCH MG: 25 TABLET ORAL at 09:10

## 2017-05-21 RX ADMIN — IPRATROPIUM BROMIDE AND ALBUTEROL SULFATE SCH ML: 2.5; .5 SOLUTION RESPIRATORY (INHALATION) at 19:35

## 2017-05-21 RX ADMIN — METHYLPREDNISOLONE SODIUM SUCCINATE SCH MG: 40 INJECTION, POWDER, FOR SOLUTION INTRAMUSCULAR; INTRAVENOUS at 14:28

## 2017-05-21 RX ADMIN — Medication SCH ML: at 05:32

## 2017-05-21 RX ADMIN — Medication SCH ML: at 14:28

## 2017-05-21 RX ADMIN — IPRATROPIUM BROMIDE AND ALBUTEROL SULFATE SCH ML: 2.5; .5 SOLUTION RESPIRATORY (INHALATION) at 07:57

## 2017-05-21 RX ADMIN — OXYCODONE AND ACETAMINOPHEN PRN TAB: 5; 325 TABLET ORAL at 06:35

## 2017-05-21 RX ADMIN — IPRATROPIUM BROMIDE AND ALBUTEROL SULFATE SCH ML: 2.5; .5 SOLUTION RESPIRATORY (INHALATION) at 14:03

## 2017-05-21 RX ADMIN — Medication SCH ML: at 21:41

## 2017-05-21 RX ADMIN — PAROXETINE HYDROCHLORIDE SCH MG: 20 TABLET, FILM COATED ORAL at 09:09

## 2017-05-21 NOTE — PDOC PROGRESS REPORT
Subjective


Progress Note for:: 05/21/17


Subjective:: 


reason for visit:  f/u COPD [facility] course:  JUDITH REESE is a 61 year old female known COPD who 

continues to smoke presents to the ED with several day history of worsening SOA 

that occurs every Spring.  She notes a dry hacking cough and breathlessness 

with a tightness across her chest asctd with wheezing and a rattle in her chest 

at times.  she reports recent abx and steroids as outpt 2 wks ago and she got 

better but seems to get worse soon after cessation.  she denies fernando cardiac 

type chest pain, palpitations, recent travel, sedentary lifestyle, calf swelling

, orthopnea, PND ,sick contacts.  she has increased her use of home nebs and 

rescue inhalers and started her rescue pred-pack 2d ago with no improvement in 

her symptoms so she presented to ED by EMS.





eval found her bronchospastic, hypoxic on blood gas with only minimal 

improvement after routine care in ED so we were asked to admit for further eval 

and management.





some improved with treatment but still very SOA with minimal exertion and still 

wheezing.





ROS:  reports continued wheezing and fatigue but denies chest pain, palpitations

, fevers/chills, N/V/D, total 10 systems reviewed, remaining systems negative





Physical Exam


Vital Signs: 


 











Temp Pulse Resp BP Pulse Ox


 


 98.0 F   87   19   148/79 H  93 


 


 05/21/17 08:21  05/21/17 08:21  05/21/17 08:21  05/21/17 08:21  05/21/17 08:21








 Intake & Output











 05/20/17 05/21/17 05/22/17





 06:59 06:59 06:59


 


Intake Total  1 


 


Output Total  400 


 


Balance  -399 


 


Weight  76.2 kg 











General appearance: PRESENT: no acute distress, well-developed, well-nourished


Head exam: PRESENT: atraumatic, normocephalic


Eye exam: PRESENT: EOMI.  ABSENT: conjunctival injection, scleral icterus


Mouth exam: PRESENT: moist


Neck exam: PRESENT: full ROM.  ABSENT: JVD


Respiratory exam: PRESENT: rhonchi, wheezes.  ABSENT: accessory muscle use, 

unlabored


Cardiovascular exam: PRESENT: RRR.  ABSENT: tachycardia


Pulses: PRESENT: normal radial pulses


Vascular exam: PRESENT: normal capillary refill


GI/Abdominal exam: PRESENT: normal bowel sounds, soft.  ABSENT: tenderness


Extremities exam: ABSENT: calf tenderness, pedal edema


Musculoskeletal exam: PRESENT: ambulatory, full ROM


Neurological exam: PRESENT: alert, awake, oriented to person, oriented to place

, oriented to time, oriented to situation


Skin exam: PRESENT: warm.  ABSENT: dry





Results


Laboratory Results: 


 





 05/21/17 05:12 





 











  05/21/17





  05:12


 


WBC  16.5 H


 


RBC  4.45


 


Hgb  13.5


 


Hct  41.0


 


MCV  92


 


MCH  30.3


 


MCHC  32.9


 


RDW  13.6


 


Plt Count  316














Assessment & Plan





- Diagnosis


(1) COPD (chronic obstructive pulmonary disease)


Qualifiers: 


     COPD type: COPD with acute exacerbation        Qualified Code(s): J44.1 - 

Chronic obstructive pulmonary disease with (acute) exacerbation  


Is this a current diagnosis for this admission?: YesPlan: 





unchange; continue IV abx, steroids, nebs and careful hemodynamic monitoring 

due to risk of resp compromise or collapse.  CTA chest neg for PE but possible 

developing pneumonia and air trapping with structural disease c/w COPD








(2) Acute bacterial bronchitis


Is this a current diagnosis for this admission?: YesPlan: 


unchange; continue as above








(3) HTN (hypertension)


Qualifiers: 


     Hypertension type: essential hypertension        Qualified Code(s): I10 - 

Essential (primary) hypertension  


Is this a current diagnosis for this admission?: Yes





(4) Acute hypoxemic respiratory failure


Is this a current diagnosis for this admission?: YesPlan: 


unchanged, continue as aobve











- Time


Time Spent with patient: 25-34 minutes


Medications reviewed and adjusted accordingly: Yes


Anticipated discharge: Home


Within: within 48 hours

## 2017-05-22 VITALS — SYSTOLIC BLOOD PRESSURE: 152 MMHG | DIASTOLIC BLOOD PRESSURE: 85 MMHG

## 2017-05-22 RX ADMIN — Medication SCH ML: at 06:26

## 2017-05-22 RX ADMIN — DOCUSATE SODIUM SCH MG: 100 CAPSULE, LIQUID FILLED ORAL at 10:00

## 2017-05-22 RX ADMIN — HYDROCODONE BITARTRATE AND ACETAMINOPHEN PRN TAB: 10; 325 TABLET ORAL at 04:10

## 2017-05-22 RX ADMIN — LANSOPRAZOLE SCH MG: 30 TABLET, ORALLY DISINTEGRATING, DELAYED RELEASE ORAL at 06:26

## 2017-05-22 RX ADMIN — ATENOLOL SCH MG: 50 TABLET ORAL at 10:01

## 2017-05-22 RX ADMIN — IPRATROPIUM BROMIDE AND ALBUTEROL SULFATE SCH ML: 2.5; .5 SOLUTION RESPIRATORY (INHALATION) at 08:35

## 2017-05-22 RX ADMIN — PAROXETINE HYDROCHLORIDE SCH MG: 20 TABLET, FILM COATED ORAL at 10:01

## 2017-05-22 RX ADMIN — METHYLPREDNISOLONE SODIUM SUCCINATE SCH MG: 40 INJECTION, POWDER, FOR SOLUTION INTRAMUSCULAR; INTRAVENOUS at 06:26

## 2017-05-22 RX ADMIN — HYDROCHLOROTHIAZIDE SCH MG: 25 TABLET ORAL at 10:00

## 2017-05-22 NOTE — PDOC DISCHARGE SUMMARY
General





- Admit/Disc Date/PCP


Admission Date/Primary Care Provider: 


  05/20/17 16:09





  TIERRA RAMIRES MD





Discharge Date: 05/22/17





- Discharge Diagnosis


(1) COPD (chronic obstructive pulmonary disease)


Is this a current diagnosis for this admission?: YesSummary: 


markedly improved with IV steroids and levaquin, will continue oral regimen at 

home for 10d; f/u with PCP in one week and keep scheduled appt with dr montiel; 

return to the ED for worsening condition








(2) Acute bacterial bronchitis


Is this a current diagnosis for this admission?: YesSummary: 


improved; treat as above








(3) HTN (hypertension)


Is this a current diagnosis for this admission?: YesSummary: 


better controlled on the atenolol, will d/c home regimen; f/u with PCP for 

further titration of her regimen








(4) Acute hypoxemic respiratory failure


Is this a current diagnosis for this admission?: YesSummary: 


resolved; she doesn't qualify for home O2 at this time; defer to pulmonary at f/

u for further rec's











- Additional Information


Resuscitation Status: Full Code


Discharge Diet: As Tolerated


Discharge Activity: Activity As Tolerated


Home Medications: 








Paroxetine HCl [Paxil 20 mg Tablet] 40 mg PO DAILY 04/10/15 


Albuterol Sulfate [Proair HFA Inhalation Aerosol 8.5 gm MDI] 2 puff IH Q4HP PRN 

#1 mdi 05/22/17 


Albuterol Sulfate [Ventolin 0.083% Neb 2.5 mg/3 mL Ampul] 2.5 mg NEB RTQ3HP PRN 

#100 vial.neb 05/22/17 


Atenolol [Tenormin 50 mg Tablet] 25 mg PO DAILY #30 tablet 05/22/17 


Levofloxacin [Levaquin 750 mg Tablet] 750 mg PO DAILY #10 tab 05/22/17 


Oxycodone HCl/Acetaminophen [Percocet  mg Tablet] 1 tab PO Q6HP PRN #0  05 /22/17 


Prednisone 20 mg PO BID #10 tablet 05/22/17 











History of Present Illness


Patient complains of: SOA


History of Present Illness: 


JUDITH REESE is a 61 year old female known COPD who continues to smoke 

presents to the ED with several day history of worsening SOA that occurs every 

Spring.  She notes a dry hacking cough and breathlessness with a tightness 

across her chest asctd with wheezing and a rattle in her chest at times.








Hospital Course


Hospital Course: 


she reports recent abx and steroids as outpt 2 wks ago and she got better but 

seems to get worse soon after cessation.  she denies fernando cardiac type chest 

pain, palpitations, recent travel, sedentary lifestyle, calf swelling, orthopnea

, PND ,sick contacts.  she has increased her use of home nebs and rescue 

inhalers and started her rescue pred-pack 2d ago with no improvement in her 

symptoms so she presented to ED by EMS.





eval found her bronchospastic, hypoxic on blood gas with only minimal 

improvement after routine care in ED so we were asked to admit for further eval 

and management.





she was admitted and started on high dose systemic IV steroids and abx with 

scheduled nebs for the first 24 hrs with good and steady improvement in her 

condition such that on the day of dc she is stable to return home and continue 

her care as an outpt.  she met with dr montiel prior to d/c and he agrees, will 

see her in f/u.  she is to f/u with her PCP in one week and return to the ED 

for any worsening of her condition.  she inquired as to home o2 but doesn't 

meet the criteria at this time.  Rxs provided for the necessary meds.  all 

querstoins asked and answered, she seems pleased with the care she received 

while here and expresses no concerns about d/c home at this time.








Physical Exam


Vital Signs: 


 











Temp Pulse Resp BP Pulse Ox


 


 98.1 F   75   20   152/85 H  93 


 


 05/22/17 08:25  05/22/17 08:35  05/22/17 08:35  05/22/17 08:25  05/22/17 08:35








 Intake & Output











 05/21/17 05/22/17 05/23/17





 06:59 06:59 06:59


 


Intake Total 1 1112 


 


Output Total 400  


 


Balance -399 1112 


 


Weight 76.2 kg 78.6 kg 











General appearance: PRESENT: no acute distress, well-developed, well-nourished


Head exam: PRESENT: atraumatic, normocephalic


Eye exam: ABSENT: conjunctival injection, scleral icterus


Mouth exam: PRESENT: moist, neck supple


Neck exam: PRESENT: full ROM.  ABSENT: JVD


Respiratory exam: PRESENT: clear to auscultation graciela, unlabored.  ABSENT: 

accessory muscle use


Cardiovascular exam: PRESENT: RRR.  ABSENT: systolic murmur


Pulses: PRESENT: normal radial pulses, normal dorsalis pedis pul


GI/Abdominal exam: PRESENT: normal bowel sounds, soft.  ABSENT: tenderness


Extremities exam: ABSENT: calf tenderness, pedal edema


Neurological exam: PRESENT: oriented to person, oriented to place, oriented to 

time


Skin exam: PRESENT: warm.  ABSENT: rash





Results


Laboratory Results: 


 





 05/21/17 05:12 








Impressions: 


 





Chest/Abdomen CTA  05/20/17 00:00


IMPRESSION:  NO PULMONARY EMBOLI.  IN THE APPROPRIATE CLINICAL SETTING, RIGHT 

MIDDLE LOBE INTERSTITIAL DENSITIES MAY REPRESENT A DEVELOPING PNEUMONITIS.


 








Chest X-Ray  05/20/17 12:47


IMPRESSION:  COPD.  NO ACUTE RADIOGRAPHIC FINDING IN THE CHEST.


 














Qualifiers


**PATEINT BEING DISCHARGED WITH ANY OF THE FOLLOWING DIAGNOSIS?: No


VTE patient discharged on overlapping Therapy?: Yes





Plan


Discharge Plan: 


dc home on 10d course of abx and steroids


Time Spent: Greater than 30 Minutes

## 2017-05-22 NOTE — PDOC CONSULTATION
Consultation


Consult Date: 17


Attending physician:: SANTO TAPIA


Consult reason:: copd





History of Present Illness


Admission Date/PCP: 


  17 16:09





  TIERRA RAMIRES MD





History of Present Illness: 


JUDITH REESE is a 61 year old female known COPD who continues to smoke 

presents to the ED with several day history of worsening SOA that occurs every 

Spring.  She notes a dry hacking cough and breathlessness with a tightness 

across her chest asctd with wheezing and a rattle in her chest at times.She 

smoked up until day of admisson smoking 1  ppd x 42 yrs admits to passive smoke 

child and adolescent,








Past Medical History


Cardiac Medical History: Reports: Hypertension


Pulmonary Medical History: Reports: Chronic Obstructive Pulmonary Disease (COPD)

, Pneumonia


   Denies: Asthma, Bronchitis


Endocrine Medical History: 


   Denies: Diabetes Mellitus Type 1, Hypothyroidism


Musculoskeltal Medical History: Reports: Other - Hx DDD


Psychiatric Medical History: Reports: Depression





Past Surgical History


Past Surgical History: Reports: Cholecystectomy, Tonsillectomy, Tubal Ligation





Social History


Information Source: Patient, [facility] Records


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


Smoking Status: Current Every Day Smoker


Cigarettes Packs Per Day: 0.5


Number of Years Smokin


Passive smoke exposure as: Both


Frequency of Alcohol Use: None


Hx Recreational Drug Use: No


Drugs: None


Hx Prescription Drug Abuse: No


Do you have pets?: Yes


Have you had any respiratory illnesses as a child?: No


Have you been exposed to any sick contacts recently?: No


Have you had any recent respiratory illnesses?: No


Have you travelled outside of [place] in the past 12 months?: No





- Advance Directive


Resuscitation Status: Full Code





Family History


Family History: CAD, COPD


Parental Family History Reviewed: Yes


Children Family History Reviewed: Yes


Sibling(s) Family History Reviewed.: Yes





Medication/Allergy


Home Medications: 








Paroxetine HCl [Paxil 20 mg Tablet] 40 mg PO DAILY 04/10/15 


Albuterol Sulfate [Proair HFA Inhalation Aerosol 8.5 gm MDI] 2 puff IH Q4HP PRN 

#1 mdi 17 


Albuterol Sulfate [Ventolin 0.083% Neb 2.5 mg/3 mL Ampul] 2.5 mg NEB RTQ3HP PRN 

#100 vial.neb 17 


Atenolol [Tenormin 50 mg Tablet] 25 mg PO DAILY #30 tablet 17 


Levofloxacin [Levaquin 750 mg Tablet] 750 mg PO DAILY #10 tab 17 


Oxycodone HCl/Acetaminophen [Percocet  mg Tablet] 1 tab PO Q6HP PRN #0   


Prednisone 20 mg PO BID #10 tablet 17 








Allergies/Adverse Reactions: 


 





ciprofloxacin [From Cipro] Allergy (Verified 17 13:50)


 


mycins Allergy (Unknown, Uncoded 17 13:50)


 











Physical Exam


Vital Signs: 


 











Temp Pulse Resp BP Pulse Ox


 


 98.1 F   70   18   152/85 H  93 


 


 17 08:25  17 08:25  17 08:25  17 08:25  17 08:25








 Intake & Output











 17





 06:59 06:59 06:59


 


Intake Total 1 1112 


 


Output Total 400  


 


Balance -399 1112 


 


Weight 76.2 kg 78.6 kg 











General appearance: PRESENT: no acute distress, well-developed


Head exam: PRESENT: atraumatic, normocephalic


Eye exam: PRESENT: conjunctiva pale, EOMI


Mouth exam: PRESENT: moist, neck supple


Neck exam: ABSENT: carotid bruit, JVD, lymphadenopathy, thyromegaly


Respiratory exam: PRESENT: prolonged expiratory phas, rhonchi, symmetrical, 

unlabored


Cardiovascular exam: PRESENT: RRR, +S1


Pulses: PRESENT: normal radial pulses


GI/Abdominal exam: PRESENT: normal bowel sounds, soft.  ABSENT: distended, 

guarding, mass, organolmegaly, rebound, tenderness


Rectal exam: PRESENT: deferred


Neurological exam: PRESENT: alert, awake


Psychiatric exam: PRESENT: normal mood


Skin exam: PRESENT: dry, warm





Results


Laboratory Results: 


 





 17 05:12 








Impressions: 


 





Chest/Abdomen CTA  17 00:00


IMPRESSION:  NO PULMONARY EMBOLI.  IN THE APPROPRIATE CLINICAL SETTING, RIGHT 

MIDDLE LOBE INTERSTITIAL DENSITIES MAY REPRESENT A DEVELOPING PNEUMONITIS.


 








Chest X-Ray  17 12:47


IMPRESSION:  COPD.  NO ACUTE RADIOGRAPHIC FINDING IN THE CHEST.


 














Assessment & Plan





- Diagnosis


(1) Acute hypoxemic respiratory failure


Is this a current diagnosis for this admission?: Yes





(2) COPD (chronic obstructive pulmonary disease)


Qualifiers: 


     COPD type: COPD with acute exacerbation        Qualified Code(s): J44.1 - 

Chronic obstructive pulmonary disease with (acute) exacerbation  


Is this a current diagnosis for this admission?: Yes





(3) HTN (hypertension)


Qualifiers: 


     Hypertension type: essential hypertension        Qualified Code(s): I10 - 

Essential (primary) hypertension  


Is this a current diagnosis for this admission?: Yes

## 2017-10-17 ENCOUNTER — HOSPITAL ENCOUNTER (OUTPATIENT)
Dept: HOSPITAL 62 - OD | Age: 62
End: 2017-10-17
Attending: INTERNAL MEDICINE
Payer: MEDICARE

## 2017-10-17 DIAGNOSIS — I10: ICD-10-CM

## 2017-10-17 DIAGNOSIS — R53.83: ICD-10-CM

## 2017-10-17 DIAGNOSIS — M25.412: ICD-10-CM

## 2017-10-17 DIAGNOSIS — Z79.899: ICD-10-CM

## 2017-10-17 DIAGNOSIS — Z13.220: ICD-10-CM

## 2017-10-17 DIAGNOSIS — M25.512: Primary | ICD-10-CM

## 2017-10-17 DIAGNOSIS — M54.2: ICD-10-CM

## 2017-10-17 DIAGNOSIS — E83.52: ICD-10-CM

## 2017-10-17 LAB
ALBUMIN SERPL-MCNC: 4.4 G/DL (ref 3.5–5)
ALP SERPL-CCNC: 69 U/L (ref 38–126)
ALT SERPL-CCNC: 34 U/L (ref 9–52)
ANION GAP SERPL CALC-SCNC: 12 MMOL/L (ref 5–19)
AST SERPL-CCNC: 22 U/L (ref 14–36)
BASOPHILS # BLD AUTO: 0.1 10^3/UL (ref 0–0.2)
BASOPHILS NFR BLD AUTO: 1.3 % (ref 0–2)
BILIRUB DIRECT SERPL-MCNC: 0.4 MG/DL (ref 0–0.4)
BILIRUB SERPL-MCNC: 0.4 MG/DL (ref 0.2–1.3)
BUN SERPL-MCNC: 12 MG/DL (ref 7–20)
CALCIUM: 12.2 MG/DL (ref 8.4–10.2)
CHLORIDE SERPL-SCNC: 101 MMOL/L (ref 98–107)
CHOLEST SERPL-MCNC: 200.06 MG/DL (ref 0–200)
CO2 SERPL-SCNC: 31 MMOL/L (ref 22–30)
CREAT SERPL-MCNC: 0.65 MG/DL (ref 0.52–1.25)
DIRECT HDL: 40 MG/DL (ref 40–?)
EOSINOPHIL # BLD AUTO: 0.3 10^3/UL (ref 0–0.6)
EOSINOPHIL NFR BLD AUTO: 5.8 % (ref 0–6)
ERYTHROCYTE [DISTWIDTH] IN BLOOD BY AUTOMATED COUNT: 13 % (ref 11.5–14)
GLUCOSE SERPL-MCNC: 89 MG/DL (ref 75–110)
HCT VFR BLD CALC: 45.2 % (ref 36–47)
HGB BLD-MCNC: 15.5 G/DL (ref 12–15.5)
HGB HCT DIFFERENCE: 1.3
LDLC SERPL DIRECT ASSAY-MCNC: 107 MG/DL (ref ?–100)
LYMPHOCYTES # BLD AUTO: 2 10^3/UL (ref 0.5–4.7)
LYMPHOCYTES NFR BLD AUTO: 34.3 % (ref 13–45)
MCH RBC QN AUTO: 31.7 PG (ref 27–33.4)
MCHC RBC AUTO-ENTMCNC: 34.2 G/DL (ref 32–36)
MCV RBC AUTO: 93 FL (ref 80–97)
MONOCYTES # BLD AUTO: 0.5 10^3/UL (ref 0.1–1.4)
MONOCYTES NFR BLD AUTO: 9.3 % (ref 3–13)
NEUTROPHILS # BLD AUTO: 2.9 10^3/UL (ref 1.7–8.2)
NEUTS SEG NFR BLD AUTO: 49.3 % (ref 42–78)
POTASSIUM SERPL-SCNC: 4.1 MMOL/L (ref 3.6–5)
PROT SERPL-MCNC: 7.3 G/DL (ref 6.3–8.2)
RBC # BLD AUTO: 4.88 10^6/UL (ref 3.72–5.28)
SODIUM SERPL-SCNC: 144.3 MMOL/L (ref 137–145)
TRIGL SERPL-MCNC: 292 MG/DL (ref ?–150)
TSH SERPL-ACNC: 1.78 UIU/ML (ref 0.47–4.68)
VLDLC SERPL CALC-MCNC: 58.4 MG/DL (ref 10–31)
WBC # BLD AUTO: 5.8 10^3/UL (ref 4–10.5)

## 2017-10-17 PROCEDURE — 82330 ASSAY OF CALCIUM: CPT

## 2017-10-17 PROCEDURE — 36415 COLL VENOUS BLD VENIPUNCTURE: CPT

## 2017-10-17 PROCEDURE — 83970 ASSAY OF PARATHORMONE: CPT

## 2017-10-17 PROCEDURE — 82310 ASSAY OF CALCIUM: CPT

## 2017-10-17 PROCEDURE — 85025 COMPLETE CBC W/AUTO DIFF WBC: CPT

## 2017-10-17 PROCEDURE — 84443 ASSAY THYROID STIM HORMONE: CPT

## 2017-10-17 PROCEDURE — 86038 ANTINUCLEAR ANTIBODIES: CPT

## 2017-10-17 PROCEDURE — 80053 COMPREHEN METABOLIC PANEL: CPT

## 2017-10-17 PROCEDURE — 80061 LIPID PANEL: CPT

## 2017-10-17 PROCEDURE — 86617 LYME DISEASE ANTIBODY: CPT

## 2017-10-17 PROCEDURE — 84439 ASSAY OF FREE THYROXINE: CPT

## 2017-10-17 PROCEDURE — 86618 LYME DISEASE ANTIBODY: CPT

## 2017-10-18 LAB — LYME DISEASE IGG AND IGM AB: <0.91 ISR (ref 0–0.9)

## 2017-10-20 ENCOUNTER — HOSPITAL ENCOUNTER (OUTPATIENT)
Dept: HOSPITAL 62 - OD | Age: 62
End: 2017-10-20
Attending: INTERNAL MEDICINE
Payer: MEDICARE

## 2017-10-20 DIAGNOSIS — M25.512: Primary | ICD-10-CM

## 2017-10-20 DIAGNOSIS — M54.2: ICD-10-CM

## 2017-10-20 PROCEDURE — 72050 X-RAY EXAM NECK SPINE 4/5VWS: CPT

## 2017-10-20 NOTE — RADIOLOGY REPORT (SQ)
EXAM DESCRIPTION:  SHOULDER LEFT 2 OR MORE VIEWS



COMPLETED DATE/TIME:  10/20/2017 1:34 pm



REASON FOR STUDY:  EFFUSION, LEFT SHOULDER I10  ESSENTIAL (PRIMARY) 
HYPERTENSION E83.52  HYPERCALCEMIA M25.412  EFFUSION, LEFT SHOULDER



COMPARISON:  None.



NUMBER OF VIEWS:  Three views.



TECHNIQUE:  Internal rotation, external rotation, and Y view images acquired of 
the left shoulder.



LIMITATIONS:  None.



FINDINGS:  MINERALIZATION: Normal.

BONES: There is old fracture of the proximal humerus.  There is no acute 
fracture or dislocation.

JOINTS: No dislocation.

VISUALIZED LUNGS AND RIBS: No pneumothorax.  No rib fracture.

SOFT TISSUES: No radiopaque foreign body.

OTHER: No other significant finding.



IMPRESSION:  NEGATIVE STUDY OF THE LEFT SHOULDER. NO RADIOGRAPHIC EVIDENCE OF 
ACUTE INJURY.



TECHNICAL DOCUMENTATION:  JOB ID:  [id number]

 2011 [employer]- All Rights Reserved



<Electronically signed by NIMO ABURTO MD in [place]> 10/20/17 1408

MTDD

## 2017-10-20 NOTE — RADIOLOGY REPORT (SQ)
EXAM DESCRIPTION:  C SP 4 OR 5 VIEWS



COMPLETED DATE/TIME:  10/20/2017 1:34 pm



REASON FOR STUDY:  CERVICALGIA I10  ESSENTIAL (PRIMARY) HYPERTENSION E83.52  
HYPERCALCEMIA M25.412  EFFUSION, LEFT SHOULDER



COMPARISON:  MRI of the cervical spine dated April 2017



NUMBER OF VIEWS:  Five views.



TECHNIQUE:  AP, lateral, obliques and odontoid radiographic images acquired of 
the cervical spine.



LIMITATIONS:  None.



FINDINGS:  MINERALIZATION: Normal.

ALIGNMENT: Anatomic.

VERTEBRAE: Vertebral bodies of normal height.

DISCS: There is decrease in the C5-C6 and C6-C7 disc space heights with 
associated osteophytic lipping.

FORAMINA: There is mild bilateral foraminal encroachment at the C5-C6 and C6-C7 
levels.

LATERAL AND POSTERIOR ELEMENTS: Degenerative changes are identified in the 
facet articulations at multiple levels.

HARDWARE: None in the spine.

SOFT TISSUES: No masses or calcifications. Lung apices clear.

OTHER: No other significant finding.



IMPRESSION:  Degenerative changes as noted above



TECHNICAL DOCUMENTATION:  JOB ID:  [id number]

 [employer]- All Rights Reserved



<Electronically signed by ADELAIDA PUGA MD in [place]> 10/20/17 1404

MTDD

## 2017-11-26 ENCOUNTER — HOSPITAL ENCOUNTER (EMERGENCY)
Dept: HOSPITAL 62 - ER | Age: 62
Discharge: HOME | End: 2017-11-26
Payer: MEDICARE

## 2017-11-26 VITALS — SYSTOLIC BLOOD PRESSURE: 150 MMHG | DIASTOLIC BLOOD PRESSURE: 95 MMHG

## 2017-11-26 DIAGNOSIS — Z90.49: ICD-10-CM

## 2017-11-26 DIAGNOSIS — I10: ICD-10-CM

## 2017-11-26 DIAGNOSIS — M54.5: Primary | ICD-10-CM

## 2017-11-26 DIAGNOSIS — J44.9: ICD-10-CM

## 2017-11-26 DIAGNOSIS — G89.29: ICD-10-CM

## 2017-11-26 DIAGNOSIS — F17.200: ICD-10-CM

## 2017-11-26 DIAGNOSIS — Z98.51: ICD-10-CM

## 2017-11-26 PROCEDURE — 99283 EMERGENCY DEPT VISIT LOW MDM: CPT

## 2017-11-26 NOTE — ER DOCUMENT REPORT
HPI





- HPI


Pain Level: 5


Context: 





63 yo female with hx/o chronic low back pain, Facets Syndrome, c/o increased 

pain since yesterday.  pt has been on pain management with Dr Wood for 

several years and has been taking Percocet 10/325 QID but pt took last pill 

yesterday am.  pt has been trying to reach office since last week regarding 

refill but unsuccessully.  office has been closed for holiday.  pt reports pain 

is familiar in location and intensity.  requesting refill of percocet until 

able to see primary care tomorrow am.  pt denies any recent trauma, fever, bowel

/bladder change.  no radiculopathy or paresthesias.  


Associated Symptoms: None


Relieved by: Sitting, Standing


Similar symptoms previously: Yes


Recently seen / treated by doctor: Yes





- REPRODUCTIVE


Reproductive: DENIES: Pregnant:





Past Medical History





- General


Information source: Patient





- Social History


Smoking Status: Current Every Day Smoker


Chew tobacco use (# tins/day): No


Frequency of alcohol use: None


Drug Abuse: None


Lives with: Family


Family History: CAD, COPD


Patient has suicidal ideation: No


Patient has homicidal ideation: No





- Past Medical History


Cardiac Medical History: Reports: Hx Hypertension


Pulmonary Medical History: Reports: Hx COPD, Hx Pneumonia


   Denies: Hx Asthma, Hx Bronchitis


Endocrine Medical History: Denies: Hx Diabetes Mellitus Type 1, Hx 

Hypothyroidism


Renal/ Medical History: Denies: Hx Peritoneal Dialysis


Psychiatric Medical History: Reports: Hx Depression


Past Surgical History: Reports: Hx Cholecystectomy, Hx Tonsillectomy, Hx Tubal 

Ligation





- Immunizations


Hx Diphtheria, Pertussis, Tetanus Vaccination: No





Vertical Provider Document





- CONSTITUTIONAL


Agree With Documented VS: Yes


Exam Limitations: No Limitations


General Appearance: WD/WN, Mild Distress - uncomfortable





- INFECTION CONTROL


TRAVEL OUTSIDE OF THE [place] IN LAST 30 DAYS: No





- HEENT


HEENT: Atraumatic, PERRLA





- NECK


Neck: Normal Inspection, Supple





- RESPIRATORY


Respiratory: Breath Sounds Normal, No Respiratory Distress


O2 Sat by Pulse Oximetry: 96





- CARDIOVASCULAR


Cardiovascular: Regular Rate, Regular Rhythm





- BACK


Back: Abnormal Inspection -  lumbar vertebral and paravertebral tenderness.  no 

SI pain





Course





- Re-evaluation


Re-evalutation: 





11/26/17 12:19


H&P c/w with exacerbation of chronic low back pain.  pt has no signs of spinal 

cord compression, cauda equina, infection, aneurysm or other serious etiology.  

pt is neurologically intact, independantly and steadly ambulatory without 

paresthesia or neurologic deficits.  Given the exremely low risk of these 

diagnoses, further testing and evaluation is not indicated.  Home care and 

follow up with primary care tomorrow.  pt agrees with plan.  short course of 

pain medication provided





blood pressure is elevated today.  pt has hx/o HTN.  has not taken medication 

today and is in pain.  denies any headache, dizziness or chest pain





- Vital Signs


Vital signs: 


 











Temp Pulse Resp BP Pulse Ox


 


 98.5 F   101 H  16   144/11 H  96 


 


 11/26/17 11:08  11/26/17 11:08  11/26/17 11:08  11/26/17 11:08  11/26/17 11:08














Discharge





- Discharge


Clinical Impression: 


Chronic back pain


Qualifiers:


 Back pain location: low back pain Sciatica presence: without sciatica 





Condition: Stable


Disposition: HOME, SELF-CARE


Instructions:  Low Back Pain ([facility]), Oral Narcotic Medication ([facility])


Additional Instructions: 


please follow up with primary care in AM for further evaluation and treatment


Prescriptions: 


Oxycodone HCl/Acetaminophen [Percocet  Mg Tablet] 1 each PO Q4H #10 tablet


Referrals: 


TIERRA RAMIRES MD [Primary Care Provider] - Follow up as needed

## 2018-07-13 ENCOUNTER — HOSPITAL ENCOUNTER (EMERGENCY)
Dept: HOSPITAL 62 - ER | Age: 63
LOS: 1 days | Discharge: HOME | End: 2018-07-14
Payer: MEDICARE

## 2018-07-13 DIAGNOSIS — R11.2: ICD-10-CM

## 2018-07-13 DIAGNOSIS — J44.9: ICD-10-CM

## 2018-07-13 DIAGNOSIS — K52.9: ICD-10-CM

## 2018-07-13 DIAGNOSIS — I10: ICD-10-CM

## 2018-07-13 DIAGNOSIS — Z79.891: ICD-10-CM

## 2018-07-13 DIAGNOSIS — R10.84: ICD-10-CM

## 2018-07-13 DIAGNOSIS — R14.3: ICD-10-CM

## 2018-07-13 DIAGNOSIS — I71.4: Primary | ICD-10-CM

## 2018-07-13 DIAGNOSIS — F17.200: ICD-10-CM

## 2018-07-13 DIAGNOSIS — G89.29: ICD-10-CM

## 2018-07-13 PROCEDURE — 96374 THER/PROPH/DIAG INJ IV PUSH: CPT

## 2018-07-13 PROCEDURE — 96361 HYDRATE IV INFUSION ADD-ON: CPT

## 2018-07-13 PROCEDURE — 80053 COMPREHEN METABOLIC PANEL: CPT

## 2018-07-13 PROCEDURE — 85025 COMPLETE CBC W/AUTO DIFF WBC: CPT

## 2018-07-13 PROCEDURE — 74177 CT ABD & PELVIS W/CONTRAST: CPT

## 2018-07-13 PROCEDURE — 36415 COLL VENOUS BLD VENIPUNCTURE: CPT

## 2018-07-13 PROCEDURE — 83690 ASSAY OF LIPASE: CPT

## 2018-07-13 PROCEDURE — 81001 URINALYSIS AUTO W/SCOPE: CPT

## 2018-07-13 PROCEDURE — 96375 TX/PRO/DX INJ NEW DRUG ADDON: CPT

## 2018-07-13 PROCEDURE — 51701 INSERT BLADDER CATHETER: CPT

## 2018-07-13 PROCEDURE — 99284 EMERGENCY DEPT VISIT MOD MDM: CPT

## 2018-07-13 PROCEDURE — 74019 RADEX ABDOMEN 2 VIEWS: CPT

## 2018-07-14 VITALS — DIASTOLIC BLOOD PRESSURE: 90 MMHG | SYSTOLIC BLOOD PRESSURE: 148 MMHG

## 2018-07-14 LAB
ADD MANUAL DIFF: NO
ALBUMIN SERPL-MCNC: 4.1 G/DL (ref 3.5–5)
ALP SERPL-CCNC: 90 U/L (ref 38–126)
ALT SERPL-CCNC: 23 U/L (ref 9–52)
ANION GAP SERPL CALC-SCNC: 12 MMOL/L (ref 5–19)
APPEARANCE UR: CLEAR
APTT PPP: (no result) S
AST SERPL-CCNC: 20 U/L (ref 14–36)
BASOPHILS # BLD AUTO: 0 10^3/UL (ref 0–0.2)
BASOPHILS NFR BLD AUTO: 0.5 % (ref 0–2)
BILIRUB DIRECT SERPL-MCNC: 0.3 MG/DL (ref 0–0.4)
BILIRUB SERPL-MCNC: 0.6 MG/DL (ref 0.2–1.3)
BILIRUB UR QL STRIP: NEGATIVE
BUN SERPL-MCNC: 10 MG/DL (ref 7–20)
CALCIUM: 9.8 MG/DL (ref 8.4–10.2)
CHLORIDE SERPL-SCNC: 104 MMOL/L (ref 98–107)
CO2 SERPL-SCNC: 29 MMOL/L (ref 22–30)
EOSINOPHIL # BLD AUTO: 0.1 10^3/UL (ref 0–0.6)
EOSINOPHIL NFR BLD AUTO: 0.6 % (ref 0–6)
ERYTHROCYTE [DISTWIDTH] IN BLOOD BY AUTOMATED COUNT: 13.4 % (ref 11.5–14)
GLUCOSE SERPL-MCNC: 139 MG/DL (ref 75–110)
GLUCOSE UR STRIP-MCNC: NEGATIVE MG/DL
HCT VFR BLD CALC: 41.9 % (ref 36–47)
HGB BLD-MCNC: 14.2 G/DL (ref 12–15.5)
KETONES UR STRIP-MCNC: (no result) MG/DL
LIPASE SERPL-CCNC: 69.7 U/L (ref 23–300)
LYMPHOCYTES # BLD AUTO: 0.9 10^3/UL (ref 0.5–4.7)
LYMPHOCYTES NFR BLD AUTO: 9.5 % (ref 13–45)
MCH RBC QN AUTO: 31.4 PG (ref 27–33.4)
MCHC RBC AUTO-ENTMCNC: 34 G/DL (ref 32–36)
MCV RBC AUTO: 92 FL (ref 80–97)
MONOCYTES # BLD AUTO: 0.6 10^3/UL (ref 0.1–1.4)
MONOCYTES NFR BLD AUTO: 6.3 % (ref 3–13)
NEUTROPHILS # BLD AUTO: 8.1 10^3/UL (ref 1.7–8.2)
NEUTS SEG NFR BLD AUTO: 83.1 % (ref 42–78)
NITRITE UR QL STRIP: NEGATIVE
PH UR STRIP: 6 [PH] (ref 5–9)
PLATELET # BLD: 388 10^3/UL (ref 150–450)
POTASSIUM SERPL-SCNC: 4.3 MMOL/L (ref 3.6–5)
PROT SERPL-MCNC: 7.2 G/DL (ref 6.3–8.2)
PROT UR STRIP-MCNC: NEGATIVE MG/DL
RBC # BLD AUTO: 4.53 10^6/UL (ref 3.72–5.28)
SODIUM SERPL-SCNC: 144.9 MMOL/L (ref 137–145)
SP GR UR STRIP: 1.04
TOTAL CELLS COUNTED % (AUTO): 100 %
UROBILINOGEN UR-MCNC: 2 MG/DL (ref ?–2)
WBC # BLD AUTO: 9.8 10^3/UL (ref 4–10.5)

## 2018-07-14 NOTE — ER DOCUMENT REPORT
ED General





- General


Chief Complaint: Nausea/Vomiting


Stated Complaint: NAUSEA/VOMITING


Time Seen by Provider: 07/14/18 00:19


Notes: 





Patient is a 63-year-old female with a past medical history of chronic pain 

currently on chronic daily narcotics who presents with 24 hours of generalized 

abdominal cramping with associated nausea and vomiting and greater than 3 days 

since her last bowel movement.  She describes abdominal pain as a cramping, 

constant but intermittently worsening pain.  Nothing improves or worsens this 

pain.  She denies any history of similar symptoms in the past.  Nothing seems 

to improve or worsen her symptoms.  She has not seen her general doctor 

regarding today's concerns.  She denies any associated fever or constitutional 

symptoms.  No dysuria, vaginal bleeding or vaginal discharge.  She has 

continued to pass flatus.  She has a prior surgical history of a 

cholecystectomy.


TRAVEL OUTSIDE OF THE [place] IN LAST 30 DAYS: No





- Related Data


Allergies/Adverse Reactions: 


 





ciprofloxacin [From Cipro] Allergy (Verified 11/26/17 11:08)


 


mycins Allergy (Unknown, Uncoded 05/20/17 13:50)


 











Past Medical History





- General


Information source: Patient





- Social History


Smoking Status: Current Every Day Smoker


Frequency of alcohol use: None


Drug Abuse: None


Lives with: Family


Family History: CAD, COPD





- Past Medical History


Cardiac Medical History: Reports: Hx Hypertension


Pulmonary Medical History: Reports: Hx COPD, Hx Pneumonia


   Denies: Hx Asthma, Hx Bronchitis


Endocrine Medical History: Denies: Hx Diabetes Mellitus Type 1, Hx 

Hypothyroidism


Renal/ Medical History: Denies: Hx Peritoneal Dialysis


Psychiatric Medical History: Reports: Hx Depression


Past Surgical History: Reports: Hx Cholecystectomy, Hx Tonsillectomy, Hx Tubal 

Ligation





- Immunizations


Hx Diphtheria, Pertussis, Tetanus Vaccination: No





Review of Systems





- Review of Systems


Notes: 





Constitutional: Negative for fever.


HENT: Negative for sore throat.


Eyes: Negative for visual changes.


Cardiovascular: Negative for chest pain.


Respiratory: Negative for shortness of breath.


Gastrointestinal: Positive for abdominal pain and vomiting


Genitourinary: Negative for dysuria.


Musculoskeletal: Negative for back pain.


Skin: Negative for rash.


Neurological: Negative for headaches, weakness or numbness.





10 point ROS negative except as marked above and in HPI.





Physical Exam





- Vital signs


Vitals: 


 











Temp Pulse Resp BP Pulse Ox


 


 99.4 F   109 H  20   164/97 H  93 


 


 07/13/18 23:39  07/13/18 23:39  07/13/18 23:39  07/13/18 23:39  07/13/18 23:39











Interpretation: Tachycardic


Notes: 





PHYSICAL EXAMINATION:





GENERAL: Appears moderately uncomfortable but in no acute distress





HEAD: Atraumatic, normocephalic.





EYES: Pupils equal round and reactive to light, extraocular movements intact, 

sclera anicteric, conjunctiva are normal.





ENT: nares patent, oropharynx clear without exudates.  Moist mucous membranes.





NECK: Normal range of motion, supple without lymphadenopathy





LUNGS: Breath sounds clear to auscultation bilaterally and equal.  No wheezes 

rales or rhonchi.





HEART: Regular tachycardia without murmurs





ABDOMEN: Soft, generalized tenderness without localization of the pain, 

normoactive bowel sounds.  No guarding, no rebound.  No masses appreciated.





EXTREMITIES: Normal range of motion, no pitting or edema.  No cyanosis.





NEUROLOGICAL: No focal neurological deficits. Moves all extremities 

spontaneously and on command.





PSYCH: Normal mood, normal affect.





SKIN: Warm, Dry, normal turgor, no rashes or lesions noted.





Course





- Re-evaluation


Re-evalutation: 





07/14/18 02:06


Patient presents with 24 hours of generalized abdominal cramping with 

associated nausea and vomiting and reduced bowel movements.  She has had 

flatus.  She is on chronic opiate therapy which suggest possible diagnosis of 

constipation but her two-view of the abdomen does not demonstrate any 

significant colonic stool burden.  Labs unremarkable without suggestion of an 

acute pancreatitis and she is remote status post cholecystectomy.  Given her 

degree of pain as well as her age and prior surgical history am concerned about 

the possibility of a bowel obstruction.  Will proceed with a CT to further 

clarify.


07/14/18 02:50


CT scan does show findings consistent with an acute colitis patient has no 

history of an inflammatory bowel disease and infectious etiologies appears to 

be most probable.  She has been able to tolerate oral intake.  Pain has 

improved.  She has had a bowel movement here in the emergency department.  She 

will be started on cephalexin and metronidazole.  Levsin as needed for pain in 

addition to her home chronic pain medications.  At this time will discharge 

with return precautions and follow-up recommendations.  Verbal discharge 

instructions given a the bedside and opportunity for questions given. 

Medication warnings reviewed. Patient is in agreement with this plan and has 

verbalized understanding of return precautions and the need for primary care 

follow-up in the next 24-72 hours.








- Vital Signs


Vital signs: 


 











Temp Pulse Resp BP Pulse Ox


 


 99.4 F   109 H  20   164/97 H  93 


 


 07/13/18 23:39  07/13/18 23:39  07/13/18 23:39  07/13/18 23:39  07/13/18 23:39














- Laboratory


Result Diagrams: 


 07/14/18 00:49





 07/14/18 00:49


Laboratory results interpreted by me: 


 











  07/14/18 07/14/18





  00:49 00:49


 


Seg Neutrophils %  83.1 H 


 


Lymphocytes %  9.5 L 


 


Glucose   139 H














Discharge





- Discharge


Clinical Impression: 


 Colitis, Abdominal cramping, Chronically on opiate therapy





Nausea and vomiting


Qualifiers:


 Vomiting type: unspecified Vomiting Intractability: non-intractable Qualified 

Code(s): R11.2 - Nausea with vomiting, unspecified





Abdominal aortic aneurysm


Qualifiers:


 Presence of rupture: without rupture Qualified Code(s): I71.4 - Abdominal 

aortic aneurysm, without rupture





Condition: Good


Disposition: HOME, SELF-CARE


Additional Instructions: 


Your seen today for generalized abdominal cramping, nausea and vomiting.  Your 

labs and CT scan suggests an infection of your descending colon and sigmoid 

colon.  You are being started on antibiotics called cephalexin and 

metronidazole to treat this infection.  Please take until they are completed.  

You can take the high-dose tingling that has been prescribed as needed for 

abdominal cramping and pain.  You may take the Zofran that you have been sent 

home with as needed for nausea.  You should return to the emergency department 

immediately if you have worsening of your pain, persistent vomiting that 

prevents her from taking fluids or medications, failure of your pain to improve 

over the next 72 hours, or any other symptoms that are worrisome to you.  As we 

discussed the CT did indicate an abdominal aortic aneurysm.  This should be 

followed up on a repeat CT scan in approximately 3 years.  Please follow-up 

with her primary care doctor within the next 24-48 hours.


Prescriptions: 


Hyoscyamine Sulfate [Levsin-Sl] 0.125 mg SL Q12HP PRN #30 tab.subl


 PRN Reason: 


Cephalexin Monohydrate [Keflex 500 mg Capsule] 500 mg PO Q6H 7 Days  capsule


Metronidazole [Flagyl 500 mg Tablet] 500 mg PO Q6H #28 tablet

## 2018-07-14 NOTE — RADIOLOGY REPORT (SQ)
EXAM DESCRIPTION: 



CT ABDOMEN PELVIS WITH IV CONTRAST



COMPLETED DATE/TME:  07/14/2018 01:30



CLINICAL HISTORY: n,v,abdominal pain



COMPARISON: None Available.



TECHNIQUE: CT of the abdomen and pelvis performed following IV

administration of 84 mL of Isovue-370.



DLP: 1411.92 mGycm



FINDINGS: 



Lung Bases: The visualized  lung bases are clear.



Bones: No destructive bone lesions identified. Degenerative

change of the spine.



Abdomen:



Liver: The liver has normal size and density. No solid

intrahepatic mass or biliary dilatation. 1.0 cm left hepatic

cyst.

Gallbladder: Prior cholecystectomy.

Spleen, Pancreas, and Adrenal Glands:  2.1 cm hypodensity in the

spleen likely represents a cyst or hemangioma. The pancreas and

adrenal glands are unremarkable.

Kidneys:  The kidneys have normal size and contour without

evidence of solid mass or hydronephrosis.  Bosniak class I

bilateral renal cysts.

Vasculature: Aortoiliac atherosclerosis. 3.1 cm infrarenal

abdominal aortic aneurysm. IVC is unremarkable.  The portal vein

is patent. The proximal visceral and renal arteries are patent. 

Stomach:  The stomach and duodenum have normal course. 

Other:  No free intraperitoneal air.  No free fluid or

lymphadenopathy. 



Pelvis:



Bladder:  Urinary bladder is unremarkable. 

Bowel:  Long segment wall thickening with pericolic inflammatory

change involving the descending and sigmoid colon. Scattered

diverticula of the colon.

Appendix:  No evidence of acute appendicitis.

Pelvis: Uterus is not enlarged.





IMPRESSION: 



1. Findings compatible with acute colitis of the descending and

sigmoid colon. This could be of infectious or inflammatory

etiology.



2. 3.1 cm infrarenal abdominal aortic aneurysm. Follow-up in 3

years recommended.



This exam was performed according to our departmental

dose-optimization program, which includes automated exposure

control, adjustment of the mA and/or kV according to patient size

and/or use of iterative reconstruction technique.

## 2018-07-14 NOTE — RADIOLOGY REPORT (SQ)
EXAM DESCRIPTION: 



XR ABDOMEN 2 VIEWS SUPINE ERECT



COMPLETED DATE/TME:  07/14/2018 00:19







EXAM DESCRIPTION:



CLINICAL HISTORY:  abdominal pain, vomiting, no bm



COMPARISON: None.



FINDINGS: Upright and spine views of the abdomen.



Bowel: No dilated loops of large or small bowel.

Peritoneum: No free intraperitoneal air identified.

Solid organs: No definite organomegaly.

Calcifications: No abnormal calcifications.

Bones: Degenerative change with levoconvex scoliosis of the

lumbar spine.

Other: Prior cholecystectomy. Visualized lung bases are clear.





IMPRESSION:

Nonobstructive bowel gas pattern.

## 2018-10-25 ENCOUNTER — HOSPITAL ENCOUNTER (OUTPATIENT)
Dept: HOSPITAL 62 - RAD | Age: 63
End: 2018-10-25
Attending: PAIN MEDICINE
Payer: MEDICAID

## 2018-10-25 DIAGNOSIS — M54.16: Primary | ICD-10-CM

## 2018-10-25 PROCEDURE — 72148 MRI LUMBAR SPINE W/O DYE: CPT

## 2018-10-25 NOTE — RADIOLOGY REPORT (SQ)
EXAM DESCRIPTION:  MRI LUMBAR SPINE WITHOUT



COMPLETED DATE/TIME:  10/25/2018 1:59 pm



REASON FOR STUDY:  M54.16 RADICULOPATHY, LUMBAR REGION M54.16  RADICULOPATHY, LUMBAR REGION



COMPARISON:  None.



TECHNIQUE:  Sagittal and Axial imaging includes T1, T2, STIR and gradient echo sequences. Coronal T2/
HASTE imaging.



LIMITATIONS:  None.



FINDINGS:  VISUALIZED UPPER ABDOMEN:  Limited evaluation. No acute or suspicious findings suggested.

SEGMENTATION: No transitional anatomy. The lowest well-developed disc space is labeled L5-S1.

ALIGNMENT: Levoscoliosis.

VERTEBRAE: There appear to be limbus deformities involving the superior and inferior endplates at L4.


BONE MARROW: Normal. No marrow replacement or reactive changes.

DISC SIGNAL: Normal. No significant abnormal signal or loss of height.

POSTERIOR ELEMENTS:  Generally intact.  No pars defect evident.

HARDWARE: None in the spine.

CORD AND CONUS: Normal in size and signal intensity. Conus at the L1-2 level.

SOFT TISSUES: 33 mm infrarenal abdominal aortic aneurysm.

L1-L2: No significant spinal stenosis or exit foraminal stenosis.

L2-L3: No significant spinal stenosis or exit foraminal stenosis.

L3-L4: Mild concentric disc bulging with no significant central canal or foraminal stenosis.

L4-L5: Concentric disc bulging with mild bilateral foraminal stenoses.  No significant central canal 
stenosis.

L5-S1: There is mild left lateral disc bulging that appears to contact the exiting nerve root.

LOWER THORACIC: Incompletely imaged.  No stenosis seen.

SACRUM: Visualized upper sacrum intact.

OTHER: No other significant findings.



IMPRESSION:  Scoliosis.  Disc bulges as described.  A left lateral disc bulge that L5-S1 appears to c
ontact the exiting nerve root outside of the neural foramen.  Small infrarenal abdominal aortic aneur
ysm.



TECHNICAL DOCUMENTATION:  JOB ID:  [id number]

 2011 [employer]- All Rights Reserved



Reading location - IP/workstation name: [email or web address]

## 2019-03-22 ENCOUNTER — HOSPITAL ENCOUNTER (OUTPATIENT)
Dept: HOSPITAL 62 - OD | Age: 64
End: 2019-03-22
Attending: INTERNAL MEDICINE
Payer: MEDICARE

## 2019-03-22 DIAGNOSIS — R53.83: ICD-10-CM

## 2019-03-22 DIAGNOSIS — M47.892: ICD-10-CM

## 2019-03-22 DIAGNOSIS — M50.03: ICD-10-CM

## 2019-03-22 DIAGNOSIS — I10: ICD-10-CM

## 2019-03-22 DIAGNOSIS — Z79.899: ICD-10-CM

## 2019-03-22 DIAGNOSIS — E78.00: Primary | ICD-10-CM

## 2019-03-22 DIAGNOSIS — M25.50: ICD-10-CM

## 2019-03-22 LAB
ADD MANUAL DIFF: NO
ALBUMIN SERPL-MCNC: 4.1 G/DL (ref 3.5–5)
ALP SERPL-CCNC: 55 U/L (ref 38–126)
ALT SERPL-CCNC: 22 U/L (ref 9–52)
ANION GAP SERPL CALC-SCNC: 10 MMOL/L (ref 5–19)
AST SERPL-CCNC: 19 U/L (ref 14–36)
BASOPHILS # BLD AUTO: 0.1 10^3/UL (ref 0–0.2)
BASOPHILS NFR BLD AUTO: 1 % (ref 0–2)
BILIRUB DIRECT SERPL-MCNC: 0.2 MG/DL (ref 0–0.4)
BILIRUB SERPL-MCNC: 0.4 MG/DL (ref 0.2–1.3)
BUN SERPL-MCNC: 13 MG/DL (ref 7–20)
CALCIUM: 10.6 MG/DL (ref 8.4–10.2)
CHLORIDE SERPL-SCNC: 100 MMOL/L (ref 98–107)
CHOLEST SERPL-MCNC: 201.18 MG/DL (ref 0–200)
CO2 SERPL-SCNC: 31 MMOL/L (ref 22–30)
EOSINOPHIL # BLD AUTO: 0.4 10^3/UL (ref 0–0.6)
EOSINOPHIL NFR BLD AUTO: 6.3 % (ref 0–6)
ERYTHROCYTE [DISTWIDTH] IN BLOOD BY AUTOMATED COUNT: 13.5 % (ref 11.5–14)
ERYTHROCYTE [SEDIMENTATION RATE] IN BLOOD: 11 MM/HR (ref 0–30)
FREE T4 (FREE THYROXINE): 1.16 NG/DL (ref 0.78–2.19)
GLUCOSE SERPL-MCNC: 103 MG/DL (ref 75–110)
HCT VFR BLD CALC: 41.3 % (ref 36–47)
HGB BLD-MCNC: 14.2 G/DL (ref 12–15.5)
LDLC SERPL DIRECT ASSAY-MCNC: 113 MG/DL (ref ?–100)
LYMPHOCYTES # BLD AUTO: 1.7 10^3/UL (ref 0.5–4.7)
LYMPHOCYTES NFR BLD AUTO: 29.9 % (ref 13–45)
MCH RBC QN AUTO: 32 PG (ref 27–33.4)
MCHC RBC AUTO-ENTMCNC: 34.5 G/DL (ref 32–36)
MCV RBC AUTO: 93 FL (ref 80–97)
MONOCYTES # BLD AUTO: 0.5 10^3/UL (ref 0.1–1.4)
MONOCYTES NFR BLD AUTO: 9 % (ref 3–13)
NEUTROPHILS # BLD AUTO: 3 10^3/UL (ref 1.7–8.2)
NEUTS SEG NFR BLD AUTO: 53.8 % (ref 42–78)
PLATELET # BLD: 341 10^3/UL (ref 150–450)
POTASSIUM SERPL-SCNC: 3.7 MMOL/L (ref 3.6–5)
PROT SERPL-MCNC: 7.1 G/DL (ref 6.3–8.2)
RBC # BLD AUTO: 4.45 10^6/UL (ref 3.72–5.28)
SODIUM SERPL-SCNC: 140.5 MMOL/L (ref 137–145)
TOTAL CELLS COUNTED % (AUTO): 100 %
TRIGL SERPL-MCNC: 225 MG/DL (ref ?–150)
TSH SERPL-ACNC: 2.4 UIU/ML (ref 0.47–4.68)
VLDLC SERPL CALC-MCNC: 45 MG/DL (ref 10–31)
WBC # BLD AUTO: 5.7 10^3/UL (ref 4–10.5)

## 2019-03-22 PROCEDURE — 72050 X-RAY EXAM NECK SPINE 4/5VWS: CPT

## 2019-03-22 PROCEDURE — 85025 COMPLETE CBC W/AUTO DIFF WBC: CPT

## 2019-03-22 PROCEDURE — 86038 ANTINUCLEAR ANTIBODIES: CPT

## 2019-03-22 PROCEDURE — 84439 ASSAY OF FREE THYROXINE: CPT

## 2019-03-22 PROCEDURE — 84443 ASSAY THYROID STIM HORMONE: CPT

## 2019-03-22 PROCEDURE — 86430 RHEUMATOID FACTOR TEST QUAL: CPT

## 2019-03-22 PROCEDURE — 36415 COLL VENOUS BLD VENIPUNCTURE: CPT

## 2019-03-22 PROCEDURE — 80061 LIPID PANEL: CPT

## 2019-03-22 PROCEDURE — 80053 COMPREHEN METABOLIC PANEL: CPT

## 2019-03-22 PROCEDURE — 85652 RBC SED RATE AUTOMATED: CPT

## 2019-03-22 NOTE — RADIOLOGY REPORT (SQ)
EXAM DESCRIPTION:  C SP 4 OR 5 VIEWS



COMPLETED DATE/TIME:  3/22/2019 10:20 am



REASON FOR STUDY:  CERVICAL DISC DISORDER W MYELOPATHY, CERVICOTHORACIC REGION R53.83  OTHER FATIGUE 
M25.50  PAIN IN UNSPECIFIED JOINT I10  ESSENTIAL (PRIMARY) HYPERTENSION



COMPARISON:  None.



NUMBER OF VIEWS:  Five views.



TECHNIQUE:  AP, lateral, obliques and odontoid radiographic images acquired of the cervical spine.



LIMITATIONS:  None.



FINDINGS:  MINERALIZATION: Osteopenia.

ALIGNMENT: Anatomic.

VERTEBRAE: Vertebral bodies of normal height.

DISCS: Mild disc space narrowing at C5-C6 and C6-C7.

FORAMINA: No osteophytes or foraminal narrowing.

LATERAL AND POSTERIOR ELEMENTS: Facets, lateral masses and spinous processes without significant find
ings.

HARDWARE: None in the spine.

SOFT TISSUES: No masses or calcifications. Lung apices clear.

OTHER: No other significant finding.



IMPRESSION:  Mild spondylosis is C5-C6 and C6-C7.  No acute findings.



TECHNICAL DOCUMENTATION:  JOB ID:  [id number]

 2011 [employer]- All Rights Reserved



Reading location - IP/workstation name: [email or web address]

## 2019-06-20 ENCOUNTER — HOSPITAL ENCOUNTER (OUTPATIENT)
Dept: HOSPITAL 62 - RAD | Age: 64
End: 2019-06-20
Attending: PAIN MEDICINE
Payer: MEDICAID

## 2019-06-20 DIAGNOSIS — G89.4: Primary | ICD-10-CM

## 2019-06-20 PROCEDURE — 72146 MRI CHEST SPINE W/O DYE: CPT

## 2019-06-20 NOTE — RADIOLOGY REPORT (SQ)
EXAM DESCRIPTION:  MRI THORACIC SPINE WITHOUT



COMPLETED DATE/TIME:  6/20/2019 2:38 pm



REASON FOR STUDY:  G89.4 CHRONIC PAIN SYNDROME G89.4  CHRONIC PAIN SYNDROME



COMPARISON:  Cervical spine five views 3/22/2019

MRI lumbar spine 10/25/2018

CT chest 5/20/2017



TECHNIQUE:  Sagittal and Axial imaging includes T1, T2, STIR and gradient echo sequences.



LIMITATIONS:  None.



FINDINGS:  LOCALIZER: Benign 2 cm cyst right upper pole kidney, 3 cm cyst in the spleen

ALIGNMENT: Convex leftward upper thoracic curvature

VERTEBRAE: Intact.

BONE MARROW: Normal. No marrow replacement or reactive changes.

HARDWARE: None in the spine.

CORD: Normal in size and signal intensity.  Conus is at the L1-2 level

SOFT TISSUES: No soft tissue masses.

THORACIC DISCS T1-T12: The T1-2, T2-3, T3-4, and T4-5 levels exhibit mild bilateral facet arthropathy
 without significant central or foraminal encroachment.

C5-6 has bilateral facet hypertrophy right greater than left with mild right foraminal narrowing.  No
 central stenosis or left foraminal narrowing.

At T6 C6-7, a small right paracentral disc bulge is present.  Minimal right foraminal narrowing.  No 
central stenosis or left foraminal stenosis.

The T7-8, T8-9 level demonstrates bilateral facet arthropathy without significant central or foramina
l encroachment.

At T9-10, moderate left foraminal narrowing results from asymmetric facet hypertrophy and leftward bu
lge and bony spurring.  No central stenosis or right foraminal narrowing.

T10-11 demonstrates high-grade left foraminal narrowing from asymmetric facet hypertrophy and bony sp
urring.  No central stenosis or right foraminal narrowing.

T11-12, T12-L1 demonstrate mild bilateral facet arthropathy without significant central or foraminal 
stenosis.

OTHER: No other significant finding.



IMPRESSION:  Multilevel facet arthropathy with foraminal narrowing as above



TECHNICAL DOCUMENTATION:  JOB ID:  [id number]

 [employer]- All Rights Reserved



Reading location - IP/workstation name: [email or web address]

## 2019-09-26 ENCOUNTER — HOSPITAL ENCOUNTER (OUTPATIENT)
Dept: HOSPITAL 62 - RAD | Age: 64
End: 2019-09-26
Payer: MEDICARE

## 2019-09-26 DIAGNOSIS — M85.88: ICD-10-CM

## 2019-09-26 DIAGNOSIS — M25.552: Primary | ICD-10-CM

## 2019-09-26 PROCEDURE — 73522 X-RAY EXAM HIPS BI 3-4 VIEWS: CPT

## 2019-09-26 NOTE — RADIOLOGY REPORT (SQ)
EXAM DESCRIPTION:  HIP BILATERAL



COMPLETED DATE/TIME:  9/26/2019 4:08 pm



REASON FOR STUDY:  M25.552 PAIN IN LEFT HIP M25.552  PAIN IN LEFT HIP



COMPARISON:  CT abdomen pelvis 7/14/2018



NUMBER OF VIEWS:  Two views.



TECHNIQUE:  Standing AP pelvis, supine AP pelvis and additional frog-leg view of the right and left h
ip.



LIMITATIONS:  None.



FINDINGS:  Bones are osteopenic.

No acute fracture over the bony pelvis or bilateral proximal femurs.

No significant hip joint space narrowing or bulky bony spurring.

Symphysis pubis, SI joints unremarkable.

Degenerative changes lower lumbar spine.



IMPRESSION:  No acute findings.  No plain film findings to explain history of left hip pain.



TECHNICAL DOCUMENTATION:  JOB ID:  [id number]

 2011 [employer]- All Rights Reserved



Reading location - IP/workstation name: [email or web address]

## 2019-11-13 ENCOUNTER — HOSPITAL ENCOUNTER (OUTPATIENT)
Dept: HOSPITAL 62 - RAD | Age: 64
End: 2019-11-13
Attending: INTERNAL MEDICINE
Payer: MEDICARE

## 2019-11-13 DIAGNOSIS — I71.4: Primary | ICD-10-CM

## 2019-11-13 PROCEDURE — 74176 CT ABD & PELVIS W/O CONTRAST: CPT

## 2019-11-13 NOTE — RADIOLOGY REPORT (SQ)
EXAM DESCRIPTION:  CT ABD/PELVIS NO ORAL OR IV



COMPLETED DATE/TIME:  11/13/2019 2:02 pm



REASON FOR STUDY:  I71.4 ABDOMINAL AORTIC ANEURYSM, WITHOUT RUPTURE I71.4  ABDOMINAL AORTIC ANEURYSM,
 WITHOUT RUPTURE



COMPARISON:  7/14/2018



TECHNIQUE:  CT scan of the abdomen and pelvis performed without intravenous or oral contrast. Images 
reviewed with lung, soft tissue, and bone windows. Reconstructed coronal and sagittal MPR images revi
ewed. All images stored on PACS.

All CT scanners at this facility use dose modulation, iterative reconstruction, and/or weight based d
osing when appropriate to reduce radiation dose to as low as reasonably achievable (ALARA).

[facility]: Dose Right  [facility]: CareDose    [facility]: Dose Right    [facility]: Teradose 4D    [facility]: Smart RoomReveal



RADIATION DOSE:  CT Rad equipment meets quality standard of care and radiation dose reduction techniq
ues were employed. CTDIvol: 7.1 mGy. DLP: 350 mGy-cm.mGy.



LIMITATIONS:  None.



FINDINGS:  LOWER CHEST: No significant findings. No nodules or infiltrates.

NON-CONTRASTED LIVER, SPLEEN, ADRENALS: There is a small hepatic cyst.  There is a small splenic cyst
.  These are unchanged from prior exam.  No adrenal lesions.

PANCREAS: No masses. No peripancreatic inflammatory changes.

GALLBLADDER: Surgically absent.

RIGHT KIDNEY AND URETER: Small right renal cysts.   No significant calcifications.   No hydronephrosi
s or hydroureter.

LEFT KIDNEY AND URETER: Small left cortical cysts.   No significant calcifications.   No hydronephros
is or hydroureter.

AORTA AND RETROPERITONEUM: 3.1 cm infrarenal abdominal aortic aneurysm is again noted.  Infrarenal ao
rta is ectatic.  There is scattered calcified plaque.

BOWEL AND PERITONEAL CAVITY: Infrequent diverticuli.  No acute diverticulitis.

APPENDIX: Not visualized.

PELVIS, BLADDER, AND ABDOMINAL WALL:No abnormal masses. No free fluid. Bladder normal.

BONES: There is lumbar scoliosis with concavity toward the right.

OTHER: No other significant finding.



IMPRESSION:  3.1 cm infrarenal abdominal aortic aneurysm.  No significant change from prior study.  P
lease see below for recommended follow-up.



COMMENT:   AAA Size:            Follow-up Recommendation

3.0-3.4 cm Every 3 years_____________________________________________________________________________
________

*Based upon the Society for Vascular Surgery Guidelines: J Vasc Surg. 2009 Oct;50(4 Suppl):S2-49

*For aortas of maximum diameter of 2.6-2.9 cm meeting the criteria for AAA (?1.5 x proximal normal se
gment)

Quality ID # 436: Final reports with documentation of one or more dose reduction techniques (e.g., Au
tomated exposure control, adjustment of the mA and/or kV according to patient size, use of iterative 
reconstruction technique)



TECHNICAL DOCUMENTATION:  JOB ID:  [id number]

 2011 [employer]- All Rights Reserved



Reading location - IP/workstation name: [email or web address]

## 2020-05-15 ENCOUNTER — HOSPITAL ENCOUNTER (EMERGENCY)
Dept: HOSPITAL 62 - ER | Age: 65
LOS: 1 days | Discharge: HOME | End: 2020-05-16
Payer: MEDICARE

## 2020-05-15 DIAGNOSIS — Z90.49: ICD-10-CM

## 2020-05-15 DIAGNOSIS — J44.9: ICD-10-CM

## 2020-05-15 DIAGNOSIS — M54.2: ICD-10-CM

## 2020-05-15 DIAGNOSIS — R05: ICD-10-CM

## 2020-05-15 DIAGNOSIS — Z98.51: ICD-10-CM

## 2020-05-15 DIAGNOSIS — I10: ICD-10-CM

## 2020-05-15 DIAGNOSIS — R06.2: ICD-10-CM

## 2020-05-15 DIAGNOSIS — F17.200: ICD-10-CM

## 2020-05-15 DIAGNOSIS — R22.1: Primary | ICD-10-CM

## 2020-05-15 PROCEDURE — 70491 CT SOFT TISSUE NECK W/DYE: CPT

## 2020-05-15 PROCEDURE — 36415 COLL VENOUS BLD VENIPUNCTURE: CPT

## 2020-05-15 PROCEDURE — 94640 AIRWAY INHALATION TREATMENT: CPT

## 2020-05-15 PROCEDURE — 80048 BASIC METABOLIC PNL TOTAL CA: CPT

## 2020-05-15 PROCEDURE — 96374 THER/PROPH/DIAG INJ IV PUSH: CPT

## 2020-05-15 PROCEDURE — 99284 EMERGENCY DEPT VISIT MOD MDM: CPT

## 2020-05-15 PROCEDURE — 96375 TX/PRO/DX INJ NEW DRUG ADDON: CPT

## 2020-05-15 PROCEDURE — 85025 COMPLETE CBC W/AUTO DIFF WBC: CPT

## 2020-05-15 NOTE — ER DOCUMENT REPORT
ED General





- General


Chief Complaint: Neck Swelling


Stated Complaint: SWOLLEN NECK


Time Seen by Provider: 05/15/20 23:35


Primary Care Provider: 


CARLOS FRANKEL MD [ACTIVE STAFF] - 05/18/20


Notes: 


Patient is a 64-year-old female that comes to the emergency department for chief

complaint of swelling and pain to the right side of her neck.  She states this 

is been present for almost 1 week, she states she was seen by her primary care 

provider Dr. Gutiérrez, she was prescribed Levaquin and prednisone, she states that 

she was also being treated for a general "up respiratory infection".  She has 

had intermittent mild cough and wheezing.  She states however her neck has 

worsened and become more obviously swollen and more tender.  She is able to 

swallow without difficulty, she denies sore throat, congestion, fever/chills, 

headache, chest pain, shortness of breath.  She smokes, has a history of COPD, 

hypertension, cholecystectomy, tonsillectomy, tubal ligation. She also states 

she had "something that looked like a pecan" removed from the opposite side of 

the neck years ago.








TRAVEL OUTSIDE OF THE [place] IN LAST 30 DAYS: No





- Related Data


Allergies/Adverse Reactions: 


                                        





ciprofloxacin [From Cipro] Allergy (Severe, Verified 05/15/20 22:15)


   


mycins Allergy (Severe, Uncoded 05/15/20 22:15)


   








Home Medications: oxycodone 10 mg TID.  Levorphanol 2mg Q 8hr PRN.  Paxil 40 mg.

 Amlopidpine.  Losartan





Past Medical History





- General


Information source: Patient





- Social History


Smoking Status: Current Every Day Smoker


Frequency of alcohol use: None


Drug Abuse: None


Family History: CAD, COPD


Patient has homicidal ideation: No





- Past Medical History


Cardiac Medical History: Reports: Hx Hypertension


Pulmonary Medical History: Reports: Hx COPD, Hx Pneumonia


   Denies: Hx Asthma, Hx Bronchitis


Endocrine Medical History: Denies: Hx Diabetes Mellitus Type 1, Hx 

Hypothyroidism


Renal/ Medical History: Denies: Hx Peritoneal Dialysis


Psychiatric Medical History: Reports: Hx Depression


Past Surgical History: Reports: Hx Cholecystectomy, Hx Tonsillectomy, Hx Tubal 

Ligation





- Immunizations


Hx Diphtheria, Pertussis, Tetanus Vaccination: No





Review of Systems





- Review of Systems


Constitutional: No symptoms reported


EENT: See HPI


Cardiovascular: No symptoms reported


Respiratory: No symptoms reported


Gastrointestinal: No symptoms reported


Genitourinary: No symptoms reported


Female Genitourinary: No symptoms reported


Musculoskeletal: No symptoms reported


Skin: No symptoms reported


Hematologic/Lymphatic: No symptoms reported


Neurological/Psychological: No symptoms reported





Physical Exam





- Vital signs


Vitals: 


                                        











Temp Pulse Resp BP Pulse Ox


 


 98.5 F   82   16   133/77 H  99 


 


 05/15/20 21:32  05/15/20 21:32  05/15/20 21:32  05/15/20 21:32  05/15/20 21:32














- Notes


Notes: 





GENERAL: Alert, interacts well. No acute distress.


HEAD: Normocephalic, atraumatic.


EYES: Pupils equal, round, and reactive to light. Extraocular movements intact.


ENT: Oral mucosa moist, tongue midline. Oropharynx unremarkable. Airway patent. 

Nares patent, sinuses non-tender, ear canals unremarkable, TM's intact.


NECK: Full range of motion. No lymphadenopathy.  There is a oval-shaped swelling

near the approximate area of the submandibular and parotid gland, the area is 

tender and slightly firm but not noted to be indurated or fluctuant.  There is 

no noted erythema surrounding this.  Otherwise unremarkable.


LUNGS: Faint scattered expiratory wheezes.  No tachypnea or labored breathing.  

Clear to auscultation bilaterally, no wheezes, rales, or rhonchi. No respiratory

distress. Non-tender chest wall. 


HEART: Regular rate and rhythm. No murmur


ABDOMEN: Soft, non-tender. Non-distended. 


EXTREMITIES: Moves all 4 extremities spontaneously. No edema, normal radial and 

dorsalis pedis pulses bilaterally. No cyanosis.


BACK: no cervical, thoracic, lumbar midline tenderness. No saddle anesthesia, 

normal distal neurovascular exam. Moves all extremities in full range of motion.


NEUROLOGICAL: Alert and oriented x3. Normal speech. Cranial nerves II through 

XII grossly intact. Strength 5/5 in all extremities. 


PSYCH: Normal affect, normal mood.


SKIN: Warm, dry, normal turgor. No rashes or lesions noted.





Course





- Re-evaluation


Re-evalutation: 


Patient initially had some scattered wheezes, patient states this is 

approximately baseline, she was given a DuoNeb while she was here.  Biggest 

concern is the swelling in the right submandibular and neck area.  The area is 

not indurated or fluctuant, there is no noted cellulitis on my exam.  Full range

of motion of the neck present, airway is patent, oral pharyngeal exam is 

unremarkable.





CBC unremarkable, chemistry nonspecific, patient was treated for symptoms.  CT 

performed with contrast, radiologist read indicates that this is a bronchial 

cleft cyst with some inflammatory areas surrounding suggesting cellulitis.





05/16/20 02:20


I called and spoke with CINDY Hong on-call.  His recommendation is she be 

given a dose of Unasyn now, continue Augmentin at home, and be seen at 9 AM on 

Monday in his office for follow-up and management.





I discussed the details at length with the patient, she states satisfaction and 

agreement with the plan.  Stable and well-appearing at time of discharge.





- Vital Signs


Vital signs: 


                                        











Temp Pulse Resp BP Pulse Ox


 


 98.1 F   76   16   100/70   92 


 


 05/16/20 02:39  05/16/20 02:39  05/16/20 02:39  05/16/20 02:42  05/16/20 02:39














- Laboratory


Result Diagrams: 


                                 05/16/20 00:03





                                 05/16/20 00:03


Laboratory results interpreted by me: 


                                        











  05/16/20





  00:03


 


Sodium  135.4 L


 


Carbon Dioxide  32 H


 


Anion Gap  4 L


 


BUN  21 H














Discharge





- Discharge


Clinical Impression: 


 Neck swelling, Neck pain





Condition: Stable


Disposition: HOME, SELF-CARE


Additional Instructions: 


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





I spoke with Dr. Frankel ENT on call (Ear Nose and Throat specialist).  Take the

Augmentin as prescribed, please be seen in the listed office at 9 AM on Monday 

for additional management and treatment.  I also recommend that you take an 

over-the-counter probiotic with the Augmentin to avoid diarrhea.





Come back if you worsen including spiking fever, developing or spreading 

redness, inability to swallow, shortness of breath, or any other concerning or 

worsening symptoms.


Referrals: 


CARLOS FRANKEL MD [ACTIVE STAFF] - 05/18/20

## 2020-05-16 VITALS — SYSTOLIC BLOOD PRESSURE: 100 MMHG | DIASTOLIC BLOOD PRESSURE: 70 MMHG

## 2020-05-16 LAB
ADD MANUAL DIFF: NO
ANION GAP SERPL CALC-SCNC: 4 MMOL/L (ref 5–19)
BASOPHILS # BLD AUTO: 0.1 10^3/UL (ref 0–0.2)
BASOPHILS NFR BLD AUTO: 1.3 % (ref 0–2)
BUN SERPL-MCNC: 21 MG/DL (ref 7–20)
CALCIUM: 9.4 MG/DL (ref 8.4–10.2)
CHLORIDE SERPL-SCNC: 99 MMOL/L (ref 98–107)
CO2 SERPL-SCNC: 32 MMOL/L (ref 22–30)
EOSINOPHIL # BLD AUTO: 0.5 10^3/UL (ref 0–0.6)
EOSINOPHIL NFR BLD AUTO: 5.1 % (ref 0–6)
ERYTHROCYTE [DISTWIDTH] IN BLOOD BY AUTOMATED COUNT: 14 % (ref 11.5–14)
GLUCOSE SERPL-MCNC: 92 MG/DL (ref 75–110)
HCT VFR BLD CALC: 41.3 % (ref 36–47)
HGB BLD-MCNC: 14 G/DL (ref 12–15.5)
LYMPHOCYTES # BLD AUTO: 2.5 10^3/UL (ref 0.5–4.7)
LYMPHOCYTES NFR BLD AUTO: 25.7 % (ref 13–45)
MCH RBC QN AUTO: 31.5 PG (ref 27–33.4)
MCHC RBC AUTO-ENTMCNC: 34 G/DL (ref 32–36)
MCV RBC AUTO: 93 FL (ref 80–97)
MONOCYTES # BLD AUTO: 0.9 10^3/UL (ref 0.1–1.4)
MONOCYTES NFR BLD AUTO: 8.8 % (ref 3–13)
NEUTROPHILS # BLD AUTO: 5.7 10^3/UL (ref 1.7–8.2)
NEUTS SEG NFR BLD AUTO: 59.1 % (ref 42–78)
PLATELET # BLD: 350 10^3/UL (ref 150–450)
POTASSIUM SERPL-SCNC: 4.5 MMOL/L (ref 3.6–5)
RBC # BLD AUTO: 4.46 10^6/UL (ref 3.72–5.28)
TOTAL CELLS COUNTED % (AUTO): 100 %
WBC # BLD AUTO: 9.7 10^3/UL (ref 4–10.5)

## 2020-05-16 NOTE — RADIOLOGY REPORT (SQ)
EXAM DESCRIPTION:



[facility]Lex: CT NECK WITH IV CONTRAST 



CLINICAL HISTORY: 

64 years  Female;  right neck swelling;



TECHNIQUE: 

CT soft tissue neck with contrast

All CT scans at this facility use dose modulation, iterative

reconstruction, and/or weight based dosing when appropriate to

reduce radiation dose to as low as reasonably achievable. 



COMPARISON: None.



FINDINGS:

There is a 1.5 cm diameter lesion in the right neck, deep to the

platysma, inferolateral to the right submandibular gland

(abutting the gland), anterior to the sternocleidomastoid muscle,

and abutting the anterior inferior margin of the right parotid

gland. Density is 29 Hounsfield units. The lesion is centrally

homogenous although there is thin marginal enhancement. Soft

tissue edema surrounds the lesion, with associated thickening of

the platysma and mild subcutaneous edema.

No ductal distention in the submandibular or parotid glands.

No enlarged cervical lymph nodes.

Bilateral carotid bifurcation calcifications are noted.

No filling defects in the major cervical veins.

Larynx and pharynx are unremarkable.

Paranasal sinuses and mastoid air cells are clear.

Chronic degenerative changes are noted throughout the cervical

spine. No acute bone findings.



IMPRESSION:



1.  1.5 cm lesion in the right neck, as described, with

associated soft tissue edema consistent with cellulitis. This is

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 Since the

lesion abuts the right parotid and submandibular glands, a cystic

mass cannot be excluded.

2.  No cervical adenopathy.

3.  Atherosclerosis, with bilateral carotid bifurcation

calcifications.

## 2020-06-03 ENCOUNTER — HOSPITAL ENCOUNTER (OUTPATIENT)
Dept: HOSPITAL 62 - OROUT | Age: 65
Discharge: HOME | End: 2020-06-03
Attending: OTOLARYNGOLOGY
Payer: MEDICARE

## 2020-06-03 VITALS — DIASTOLIC BLOOD PRESSURE: 76 MMHG | SYSTOLIC BLOOD PRESSURE: 118 MMHG

## 2020-06-03 DIAGNOSIS — Z03.818: ICD-10-CM

## 2020-06-03 DIAGNOSIS — J44.9: ICD-10-CM

## 2020-06-03 DIAGNOSIS — Q18.0: Primary | ICD-10-CM

## 2020-06-03 DIAGNOSIS — I10: ICD-10-CM

## 2020-06-03 DIAGNOSIS — F17.210: ICD-10-CM

## 2020-06-03 DIAGNOSIS — Z79.899: ICD-10-CM

## 2020-06-03 PROCEDURE — 42815 EXCISION OF NECK CYST: CPT

## 2020-06-03 PROCEDURE — C9803 HOPD COVID-19 SPEC COLLECT: HCPCS

## 2020-06-03 PROCEDURE — 84132 ASSAY OF SERUM POTASSIUM: CPT

## 2020-06-03 PROCEDURE — 88305 TISSUE EXAM BY PATHOLOGIST: CPT

## 2020-06-03 PROCEDURE — 36415 COLL VENOUS BLD VENIPUNCTURE: CPT

## 2020-06-03 PROCEDURE — 87635 SARS-COV-2 COVID-19 AMP PRB: CPT

## 2020-06-03 NOTE — OPERATIVE REPORT
[facility]


Operative Report: 





Date: 03 June 2020


 


History: 65-year-old female with a history of right neck mass that was recently 

infected.  CT scan was performed which identified a mass along the anterior 

border of sternocleidomastoid muscle sandwiched between the parotid gland and 

the submandibular gland gland.  Appears to be consistent with a branchial cleft 

cyst.  Patient presents for excision of the branchial cleft cyst.  Informed 

consent was obtained for the patient


 


Pre-operative diagnosis: Branchial cleft cyst, right


 


Post operative diagnosis: Same as above


 


Procedure: Excision branchial cleft cyst and sinus tract, right [CPT - 28310]


 


Surgeon:  August [facility] MD, FACS, [facility]


 


Anesthesia: General via endotracheal intubation


 


Procedure: After receiving informed consent from the patient, she was taken to 

the operating room and placed supine on the operating table.  The CT scans were 

available and in the room for viewing during the case.  After successful 

intubation by anesthesia, the bed was turned 90 degrees, shoulder roll placed 

and neck turned towards the left side to expose the right neck.  The branchial 

cleft cyst was palpated and the incision marked and infiltrated with 2% 

lidocaine 100,000 epinephrine.  The nerve integrity monitor was used to monitor 

the marginal mandibular nerve.  This was set up and calibrated.  Patient was 

then prepped and draped in sterile fashion.  15 blade used to make an incision 

through the subcutaneous tissue and through the platysma.  At this time the mass

was identified as a cystic structure and carefully dissected from surrounding 

tissue.  The sinus tract was identified on the inferior portion of the cyst and 

was dissected inferiorly where it was transected using the LigaSure.  The mass 

was actually between the submandibular gland anteriorly and the parotid gland 

posteriorly.  There was also a lot of fibrosis surrounding the mass.  Hemostasis

was obtained with bipolar electrocautery.  The wound was then irrigated with 

normal saline.  No bleeding was noted.  Surgicel placed in the wound bed.  4-0 

Monocryl was then used to close the platysma and dermal layers.  Dermabond 

Mastisol Steri-Strips and a pressure dressing applied.  Patient given back to 

anesthesia who successfully extubated the patient without complications.  No 

surgical complications.


 


Estimated blood loss: 5 mL


Fluids: 800 mL


 


The patient was then transported to the Post Anesthesia Care Unit in stable 

condition with spontaneous respiration.  No complication.